# Patient Record
Sex: FEMALE | Race: BLACK OR AFRICAN AMERICAN | NOT HISPANIC OR LATINO | Employment: PART TIME | URBAN - METROPOLITAN AREA
[De-identification: names, ages, dates, MRNs, and addresses within clinical notes are randomized per-mention and may not be internally consistent; named-entity substitution may affect disease eponyms.]

---

## 2017-05-31 ENCOUNTER — OFFICE VISIT (OUTPATIENT)
Dept: FAMILY MEDICINE CLINIC | Facility: CLINIC | Age: 62
End: 2017-05-31

## 2017-05-31 VITALS
DIASTOLIC BLOOD PRESSURE: 80 MMHG | WEIGHT: 207 LBS | HEART RATE: 62 BPM | BODY MASS INDEX: 35.53 KG/M2 | SYSTOLIC BLOOD PRESSURE: 128 MMHG | OXYGEN SATURATION: 98 %

## 2017-05-31 DIAGNOSIS — G43.111 INTRACTABLE MIGRAINE WITH AURA WITH STATUS MIGRAINOSUS: Primary | ICD-10-CM

## 2017-05-31 PROCEDURE — 99213 OFFICE O/P EST LOW 20 MIN: CPT | Performed by: NURSE PRACTITIONER

## 2017-05-31 RX ORDER — SUMATRIPTAN 100 MG/1
100 TABLET, FILM COATED ORAL ONCE AS NEEDED
Qty: 9 TABLET | Refills: 6 | Status: SHIPPED | OUTPATIENT
Start: 2017-05-31

## 2018-04-05 ENCOUNTER — APPOINTMENT (OUTPATIENT)
Dept: WOMENS IMAGING | Facility: HOSPITAL | Age: 63
End: 2018-04-05

## 2018-04-05 PROCEDURE — 76641 ULTRASOUND BREAST COMPLETE: CPT | Performed by: RADIOLOGY

## 2018-04-05 PROCEDURE — G0279 TOMOSYNTHESIS, MAMMO: HCPCS | Performed by: RADIOLOGY

## 2018-04-05 PROCEDURE — 77062 BREAST TOMOSYNTHESIS BI: CPT | Performed by: RADIOLOGY

## 2018-04-05 PROCEDURE — 77066 DX MAMMO INCL CAD BI: CPT | Performed by: RADIOLOGY

## 2018-04-10 ENCOUNTER — APPOINTMENT (OUTPATIENT)
Dept: WOMENS IMAGING | Facility: HOSPITAL | Age: 63
End: 2018-04-10

## 2018-04-10 PROCEDURE — 19083 BX BREAST 1ST LESION US IMAG: CPT | Performed by: RADIOLOGY

## 2018-10-08 ENCOUNTER — APPOINTMENT (OUTPATIENT)
Dept: WOMENS IMAGING | Facility: HOSPITAL | Age: 63
End: 2018-10-08

## 2018-10-08 PROCEDURE — 76641 ULTRASOUND BREAST COMPLETE: CPT | Performed by: RADIOLOGY

## 2019-04-15 ENCOUNTER — OFFICE VISIT (OUTPATIENT)
Dept: FAMILY MEDICINE CLINIC | Facility: CLINIC | Age: 64
End: 2019-04-15

## 2019-04-15 VITALS
OXYGEN SATURATION: 99 % | HEIGHT: 65 IN | WEIGHT: 200 LBS | BODY MASS INDEX: 33.32 KG/M2 | HEART RATE: 64 BPM | RESPIRATION RATE: 16 BRPM | SYSTOLIC BLOOD PRESSURE: 144 MMHG | DIASTOLIC BLOOD PRESSURE: 90 MMHG

## 2019-04-15 DIAGNOSIS — M54.42 ACUTE LEFT-SIDED LOW BACK PAIN WITH LEFT-SIDED SCIATICA: Primary | ICD-10-CM

## 2019-04-15 PROCEDURE — 99213 OFFICE O/P EST LOW 20 MIN: CPT | Performed by: NURSE PRACTITIONER

## 2019-04-15 RX ORDER — METHYLPREDNISOLONE 4 MG/1
TABLET ORAL
Qty: 1 EACH | Refills: 0 | Status: SHIPPED | OUTPATIENT
Start: 2019-04-15 | End: 2019-08-19

## 2019-04-15 NOTE — PROGRESS NOTES
"Cece Bustamante is a 63 y.o. female.      Assessment/Plan   Problem List Items Addressed This Visit     None             No Follow-up on file.  There are no Patient Instructions on file for this visit.    Chief Complaint   Patient presents with   • Leg Pain   • Sciatica     Social History     Tobacco Use   • Smoking status: Former Smoker     Last attempt to quit: 1973     Years since quittin.3   • Smokeless tobacco: Never Used   Substance Use Topics   • Alcohol use: No   • Drug use: No       History of Present Illness   Cece is a pt of Dr Horner.  She is here today w/ Lt leg pain that she believes to be sciatica.  She states she was told many years ago she had a bulging disc.  She states sx began about 3 weeks ago in her Rt leg and then after a few days it went away.  She states 2 weeks ago the pain came back much more severe in the Lt.  She is limping. She is having difficulty sleeping.  She is taking Ibu 800mg which seems to help.    The following portions of the patient's history were reviewed and updated as appropriate:PMHroutine: Social history , Allergies, Current Medications, Active Problem List and Health Maintenance    Review of Systems   Constitutional: Positive for activity change.   Musculoskeletal: Positive for back pain and gait problem.        Buttock pain and Lt leg pain   Neurological: Negative for dizziness, numbness and headaches.   All other systems reviewed and are negative.      Objective   Vitals:    04/15/19 0735   BP: 144/90   Pulse: 64   Resp: 16   SpO2: 99%   Weight: 90.7 kg (200 lb)   Height: 165.1 cm (65\")     Body mass index is 33.28 kg/m².  Physical Exam   Constitutional: She appears well-developed and well-nourished. No distress.   HENT:   Head: Normocephalic and atraumatic.   Eyes: EOM are normal.   Neck: Neck supple. No thyromegaly present.   Cardiovascular: Normal rate, regular rhythm and normal heart sounds.   Pulmonary/Chest: Effort normal and breath sounds normal. "   Musculoskeletal: She exhibits tenderness.   Neurological: She is alert. She displays normal reflexes. No cranial nerve deficit. She exhibits normal muscle tone. Coordination normal.   Skin: Skin is warm.   Nursing note and vitals reviewed.    Reviewed Data:  No visits with results within 1 Month(s) from this visit.   Latest known visit with results is:   Office Visit on 06/17/2016   Component Date Value Ref Range Status   • WBC 06/17/2016 4.46* 4.50 - 10.70 10*3/mm3 Final   • RBC 06/17/2016 4.05  3.90 - 5.20 10*6/mm3 Final   • Hemoglobin 06/17/2016 12.7  11.9 - 15.5 g/dL Final   • Hematocrit 06/17/2016 37.9  35.6 - 45.5 % Final   • MCV 06/17/2016 93.6  80.5 - 98.2 fL Final   • MCH 06/17/2016 31.4  26.9 - 32.7 pg Final   • MCHC 06/17/2016 33.5  32.4 - 36.3 g/dL Final   • RDW 06/17/2016 14.1* 11.7 - 13.0 % Final   • Platelets 06/17/2016 267  140 - 500 10*3/mm3 Final   • Neutrophil Rel % 06/17/2016 29.7* 42.7 - 76.0 % Final   • Lymphocyte Rel % 06/17/2016 58.7* 19.6 - 45.3 % Final   • Monocyte Rel % 06/17/2016 9.0  5.0 - 12.0 % Final   • Eosinophil Rel % 06/17/2016 2.2  0.3 - 6.2 % Final   • Basophil Rel % 06/17/2016 0.4  0.0 - 1.5 % Final   • Neutrophils Absolute 06/17/2016 1.32* 1.90 - 8.10 10*3/mm3 Final   • Lymphocytes Absolute 06/17/2016 2.62  0.90 - 4.80 10*3/mm3 Final   • Monocytes Absolute 06/17/2016 0.40  0.20 - 1.20 10*3/mm3 Final   • Eosinophils Absolute 06/17/2016 0.10  0.00 - 0.70 10*3/mm3 Final   • Basophils Absolute 06/17/2016 0.02  0.00 - 0.20 10*3/mm3 Final   • Immature Granulocyte Rel % 06/17/2016 0.0  0.0 - 0.5 % Final   • Immature Grans Absolute 06/17/2016 0.00  0.00 - 0.03 10*3/mm3 Final   • Total Cholesterol 06/17/2016 239* 0 - 200 mg/dL Final   • Triglycerides 06/17/2016 114  0 - 150 mg/dL Final   • HDL Cholesterol 06/17/2016 101* 40 - 60 mg/dL Final   • VLDL Cholesterol 06/17/2016 22.8  5 - 40 mg/dL Final   • LDL Cholesterol  06/17/2016 115* 0 - 100 mg/dL Final   • LDL/HDL Ratio 06/17/2016  1.14   Final   • Glucose 06/17/2016 90  65 - 99 mg/dL Final   • BUN 06/17/2016 13  8 - 23 mg/dL Final   • Creatinine 06/17/2016 0.81  0.57 - 1.00 mg/dL Final   • eGFR Non  Am 06/17/2016 71  >60 mL/min/1.73 Final   • eGFR African Am 06/17/2016 87  >60 mL/min/1.73 Final   • BUN/Creatinine Ratio 06/17/2016 16.0  7.0 - 25.0 Final   • Sodium 06/17/2016 143  136 - 145 mmol/L Final   • Potassium 06/17/2016 4.7  3.5 - 5.2 mmol/L Final   • Chloride 06/17/2016 104  98 - 107 mmol/L Final   • Total CO2 06/17/2016 26.6  22.0 - 29.0 mmol/L Final   • Calcium 06/17/2016 9.5  8.6 - 10.5 mg/dL Final   • Total Protein 06/17/2016 7.3  6.0 - 8.5 g/dL Final   • Albumin 06/17/2016 4.20  3.50 - 5.20 g/dL Final   • Globulin 06/17/2016 3.1  gm/dL Final   • A/G Ratio 06/17/2016 1.4  g/dL Final   • Total Bilirubin 06/17/2016 0.2  0.1 - 1.2 mg/dL Final   • Alkaline Phosphatase 06/17/2016 74  39 - 117 U/L Final   • AST (SGOT) 06/17/2016 20  1 - 32 U/L Final   • ALT (SGPT) 06/17/2016 15  1 - 33 U/L Final   • TSH 06/17/2016 2.700  0.270 - 4.200 mIU/mL Final   • Differential Comment 06/17/2016 Comment   Final    WBC MORPHOLOGY               Normal                      Normal     N   • Comment 06/17/2016 Comment   Final    RBC MORPHOLOGY               Normal                      Normal     N   • Plt Comment 06/17/2016 Comment   Final    PLATELET MORPHOLOGY          Normal                      Normal     N   • Hemoglobin A1C 06/17/2016 6.00* 4.80 - 5.60 % Final    Comment: Hemoglobin A1C Ranges:  Increased Risk for Diabetes  5.7% to 6.4%  Diabetes                     >= 6.5%  Diabetic Goal                < 7.0%

## 2019-04-15 NOTE — PATIENT INSTRUCTIONS
Sciatica  Sciatica is pain, numbness, weakness, or tingling along your sciatic nerve. The sciatic nerve starts in the lower back and goes down the back of each leg. Sciatica happens when this nerve is pinched or has pressure put on it. Sciatica usually goes away on its own or with treatment. Sometimes, sciatica may keep coming back (recur).  Follow these instructions at home:  Medicines  · Take over-the-counter and prescription medicines only as told by your doctor.  · Do not drive or use heavy machinery while taking prescription pain medicine.  Managing pain  · If directed, put ice on the affected area.  ? Put ice in a plastic bag.  ? Place a towel between your skin and the bag.  ? Leave the ice on for 20 minutes, 2-3 times a day.  · After icing, apply heat to the affected area before you exercise or as often as told by your doctor. Use the heat source that your doctor tells you to use, such as a moist heat pack or a heating pad.  ? Place a towel between your skin and the heat source.  ? Leave the heat on for 20-30 minutes.  ? Remove the heat if your skin turns bright red. This is especially important if you are unable to feel pain, heat, or cold. You may have a greater risk of getting burned.  Activity  · Return to your normal activities as told by your doctor. Ask your doctor what activities are safe for you.  ? Avoid activities that make your sciatica worse.  · Take short rests during the day. Rest in a lying or standing position. This is usually better than sitting to rest.  ? When you rest for a long time, do some physical activity or stretching between periods of rest.  ? Avoid sitting for a long time without moving. Get up and move around at least one time each hour.  · Exercise and stretch regularly, as told by your doctor.  · Do not lift anything that is heavier than 10 lb (4.5 kg) while you have symptoms of sciatica.  ? Avoid lifting heavy things even when you do not have symptoms.  ? Avoid lifting heavy  things over and over.  · When you lift objects, always lift in a way that is safe for your body. To do this, you should:  ? Bend your knees.  ? Keep the object close to your body.  ? Avoid twisting.  General instructions  · Use good posture.  ? Avoid leaning forward when you are sitting.  ? Avoid hunching over when you are standing.  · Stay at a healthy weight.  · Wear comfortable shoes that support your feet. Avoid wearing high heels.  · Avoid sleeping on a mattress that is too soft or too hard. You might have less pain if you sleep on a mattress that is firm enough to support your back.  · Keep all follow-up visits as told by your doctor. This is important.  Contact a doctor if:  · You have pain that:  ? Wakes you up when you are sleeping.  ? Gets worse when you lie down.  ? Is worse than the pain you have had in the past.  ? Lasts longer than 4 weeks.  · You lose weight for without trying.  Get help right away if:  · You cannot control when you pee (urinate) or poop (have a bowel movement).  · You have weakness in any of these areas and it gets worse.  ? Lower back.  ? Lower belly (pelvis).  ? Butt (buttocks).  ? Legs.  · You have redness or swelling of your back.  · You have a burning feeling when you pee.  This information is not intended to replace advice given to you by your health care provider. Make sure you discuss any questions you have with your health care provider.  Document Released: 09/26/2009 Document Revised: 05/25/2017 Document Reviewed: 08/26/2016  Elsevier Interactive Patient Education © 2019 [employer]

## 2019-04-18 PROBLEM — G43.719 INTRACTABLE CHRONIC MIGRAINE WITHOUT AURA AND WITHOUT STATUS MIGRAINOSUS: Status: ACTIVE | Noted: 2017-06-05

## 2019-04-18 RX ORDER — IBUPROFEN 800 MG/1
TABLET ORAL
Qty: 90 TABLET | Refills: 2 | Status: SHIPPED | OUTPATIENT
Start: 2019-04-18 | End: 2022-01-14

## 2019-04-22 ENCOUNTER — HOSPITAL ENCOUNTER (OUTPATIENT)
Dept: PHYSICAL THERAPY | Facility: HOSPITAL | Age: 64
Setting detail: THERAPIES SERIES
Discharge: HOME | End: 2019-04-22

## 2019-04-22 DIAGNOSIS — M54.42 ACUTE LEFT-SIDED LOW BACK PAIN WITH LEFT-SIDED SCIATICA: Primary | ICD-10-CM

## 2019-04-22 PROCEDURE — 97162 PT EVAL MOD COMPLEX 30 MIN: CPT | Performed by: PHYSICAL THERAPIST

## 2019-04-22 PROCEDURE — 97110 THERAPEUTIC EXERCISES: CPT | Performed by: PHYSICAL THERAPIST

## 2019-04-22 NOTE — THERAPY EVALUATION
"    Outpatient Physical Therapy Ortho Initial Evaluation  [facility]     Patient Name: Cece Bustamante  : 1955  MRN: [id number]  Today's Date: 2019      Visit Date: 2019    Patient Active Problem List   Diagnosis   • Cervical disc disorder   • Carpal tunnel syndrome of right wrist   • Intractable chronic migraine without aura and without status migrainosus        Past Medical History:   Diagnosis Date   • Arthritis    • Cervical disc disorder    • Headache    • Lumbosacral disc disease         Past Surgical History:   Procedure Laterality Date   •  SECTION     • CHOLECYSTECTOMY     • COLONOSCOPY         Visit Dx:     ICD-10-CM ICD-9-CM   1. Acute left-sided low back pain with left-sided sciatica M54.42 724.2     724.3         Patient History     Row Name 19 1500             History    Chief Complaint  Pain  -      Type of Pain  Back pain  -      Date Current Problem(s) Began  19 chronic intermittent  -LC      Brief Description of Current Complaint  Pt reports 15 years of chronic low back pain with referred pain symptoms crossing L knee and into L calf. She reports severe pain with tingling into LLE. She reports severe pain with weight bearing through LLE, all bed mobility, ascending/descending stairs, getting in/out of car. When asked if there's anything that makes her back feel better she reports \"meds\". She has not had any back surgery or injections into back. She says that MD years ago told that she has bulging disc in low back. Pt reports that pain in back feels like \"bone on bone\" pain with all movements.  -      Previous treatment for THIS PROBLEM  Medication  -      Patient/Caregiver Goals  Relieve pain;Return to prior level of function;Improve mobility;Improve strength;Know what to do to help the symptoms  -      Patient's Rating of General Health  Good  -      Occupation/sports/leisure activities  teacher  -      How has patient tried to help current " "problem?  ibuprofen, heating pad  -LC      What clinical tests have you had for this problem?  MRI  -LC      Results of Clinical Tests  Per   -LC         Pain     Pain Location  Buttocks;Back  -LC      Pain at Present  2  -LC      Pain at Best  1  -LC      Pain at Worst  10  -LC      Pain Frequency  Constant/continuous  -LC      Pain Description  Aching;Sore;Sharp;Pins and needles;Radiating  -LC      What Performance Factors Make the Current Problem(s) WORSE?  any movement with LLE  -LC      What Performance Factors Make the Current Problem(s) BETTER?  \"meds\" per patient  -LC      Is medication used to assist with sleep?  Yes  -LC      Difficulties with ADL's?  all ADL's per patient  -LC         Fall Risk Assessment    Any falls in the past year:  No  -LC         Daily Activities    Primary Language  English  -LC      Pt Participated in POC and Goals  Yes  -LC         Safety    Are you being hurt, hit, or frightened by anyone at home or in your life?  No  -LC      Are you being neglected by a caregiver  No  -LC        User Key  (r) = Recorded By, (t) = Taken By, (c) = Cosigned By    Initials Name Provider Type    LC Timothy Burns, PT DPT Physical Therapist          PT Ortho     Row Name 04/22/19 1500       Posture/Observations    Posture/Observations Comments  antalgic gait, forward flexed posture  -LC       Lumbar/SI Special Tests    Standing Flexion Test (SI Dysfunction)  Left:;Positive  -LC    Stork Test (SI Dysfunction)  Left:;Positive  -LC    Seated Flexion Test (SI Dysfunction)  Left:;Positive  -LC    Constance Samson Test (HNP)  Left:;Negative  -LC    CHRISTIANO (hip vs. SI Dysfunction)  Left:;Positive  -LC       MMT (Manual Muscle Testing)    Neck/Trunk  Trunk Flexion  -LC    Rt Lower Ext  Rt Hip Flexion;Rt Hip Extension;Rt Hip ABduction;Rt Hip ADduction;Rt Knee Extension;Rt Knee Flexion  -LC    Lt Lower Ext  Lt Hip Flexion;Lt Hip ABduction;Lt Hip Extension;Lt Hip ADduction;Lt Knee Extension;Lt Knee Flexion  -LC       " MMT Neck/Trunk    Trunk Flexion MMT, Gross Movement  (3-/5) fair minus  -LC    Neck/Trunk Comments   poor coordination for pelvic tilt  -LC       MMT Right Lower Ext    Rt Hip Flexion MMT, Gross Movement  (4/5) good  -LC    Rt Hip Extension MMT, Gross Movement  (4/5) good  -LC    Rt Hip ABduction MMT, Gross Movement  (4/5) good  -LC    Rt Hip ADduction MMT, Gross Movement  (4/5) good  -LC    Rt Knee Extension MMT, Gross Movement  (4/5) good  -LC    Rt Knee Flexion MMT, Gross Movement  (4/5) good  -LC       MMT Left Lower Ext    Lt Hip Flexion MMT, Gross Movement  (3/5) fair  -LC    Lt Hip Extension MMT, Gross Movement  (3/5) fair  -LC    Lt Hip ABduction MMT, Gross Movement  (3+/5) fair plus  -LC    Lt Hip ADduction MMT, Gross Movement  (3+/5) fair plus  -LC    Lt Knee Extension MMT, Gross Movement  (3+/5) fair plus  -LC    Lt Knee Flexion MMT, Gross Movement  (3+/5) fair plus  -LC      User Key  (r) = Recorded By, (t) = Taken By, (c) = Cosigned By    Initials Name Provider Type    Timothy Blackmon, PT DPT Physical Therapist                      Therapy Education  Given: HEP, Symptoms/condition management, Pain management  Program: New  How Provided: Verbal, Demonstration, Written  Provided to: Patient  Level of Understanding: Teach back education performed, Verbalized, Demonstrated     PT OP Goals     Row Name 04/22/19 1700          PT Short Term Goals    STG 1  Pt will be independent with HEP to improve lumbar stability and reduce pain with ADL's.  -LC     STG 1 Progress  New  -LC     STG 2  Pt will report pain no worse than 5/10 in low back  -LC     STG 2 Progress  New  -LC     STG 3  Pt will have LLE MMT grossly 4/5  -LC     STG 3 Progress  New  -LC        Long Term Goals    LTG 1  Pt will have modified Oswestry < 50%  -LC     LTG 1 Progress  New  -LC        Time Calculation    PT Goal Re-Cert Due Date  07/22/19  -LC       User Key  (r) = Recorded By, (t) = Taken By, (c) = Cosigned By    Initials Name Provider  "Type    LC Timothy Burns, PT DPT Physical Therapist          PT Assessment/Plan     Row Name 04/22/19 8415          PT Assessment    Functional Limitations  Impaired gait;Limitations in community activities;Limitations in functional capacity and performance;Performance in leisure activities;Performance in self-care ADL;Performance in work activities  -     Impairments  Gait;Joint integrity;Pain;Muscle strength  -     Assessment Comments  Pt is 63 y.o. female who presents with chronic severe low back pain with associated referred pain symptoms into L extremity. She reports 15 years ago onset with MD telling her she had \"bulging disc in lumbar spine\". Pt has not had recent lumbar spine imaging. She reports pain is 10/10 when it occurs and it worsens with all movements. She has general core weakness with poor ability to perform pelvic tilt. Pt was educated on general HEP for lumbar stability and core strengthening. She was issued written copy of HEP. She will benefit from general strengthening and increasing endurance. Special tests indicate L side SI joint pathology could be causing her referred pain symptoms.  -     Please refer to paper survey for additional self-reported information  Yes  -LC     Rehab Potential  Fair  -LC     Patient/caregiver participated in establishment of treatment plan and goals  Yes  -LC     Patient would benefit from skilled therapy intervention  Yes  -LC        PT Plan    PT Frequency  2x/week  -     Predicted Duration of Therapy Intervention (Therapy Eval)  4 to 6 week  -     Planned CPT's?  PT EVAL MOD COMPLELITY: 59357;PT RE-EVAL: 93158;PT THER ACT EA 15 MIN: 58305;PT NEUROMUSC RE-EDUCATION EA 15 MIN: 21485;PT MANUAL THERAPY EA 15 MIN: 82398;PT HOT/COLD PACK WC NONMCARE: 76123;PT ELECTRICAL STIM UNATTEND: ;PT ULTRASOUND EA 15 MIN: 51240  -     Physical Therapy Interventions (Optional Details)  lumbar stabilization;neuromuscular " re-education;stretching;strengthening;postural re-education;modalities;manual therapy techniques;patient/family education;home exercise program  -LC     PT Plan Comments  Skilled intervention to increase core strength and alleviate pain symptoms with ADL's.  -LC       User Key  (r) = Recorded By, (t) = Taken By, (c) = Cosigned By    Initials Name Provider Type    Timothy Blackmon, PT DPT Physical Therapist            Exercises     Row Name 04/22/19 1700             Total Minutes    57961 - PT Therapeutic Exercise Minutes  15  -LC         Exercise 1    Exercise Name 1  low trunk rotation  -LC      Sets 1  2  -LC      Reps 1  10  -LC         Exercise 2    Exercise Name 2  hook hip ADD  -LC      Sets 2  3  -LC      Reps 2  6  -LC      Time 2  3  -LC         Exercise 3    Exercise Name 3  hook hip ABD blue  -LC      Sets 3  3  -LC      Reps 3  6  -LC         Exercise 4    Exercise Name 4  piriformis stretch fig 4  -LC      Sets 4  1  -LC      Reps 4  6  -LC      Time 4  10  -LC        User Key  (r) = Recorded By, (t) = Taken By, (c) = Cosigned By    Initials Name Provider Type    Timothy Blackmon, PT DPT Physical Therapist                        Outcome Measure Options: Modifed Owestry  Modified Oswestry  Modified Oswestry Score/Comments: 82%      Time Calculation:     Start Time: 1545  Stop Time: 1625  Time Calculation (min): 40 min  Total Timed Code Minutes- PT: 40 minute(s)     Therapy Charges for Today     Code Description Service Date Service Provider Modifiers Qty    [id number]  PT THER PROC EA 15 MIN 4/22/2019 Timothy Burns, PT DPT GP 1    [id number]  PT EVAL MOD COMPLEXITY 2 4/22/2019 Timothy Burns, PT DPT GP 1          PT G-Codes  Outcome Measure Options: Modifed Owestry  Modified Oswestry Score/Comments: 82%         KIM GaT  4/22/2019

## 2019-05-01 ENCOUNTER — HOSPITAL ENCOUNTER (OUTPATIENT)
Dept: PHYSICAL THERAPY | Facility: HOSPITAL | Age: 64
Setting detail: THERAPIES SERIES
Discharge: HOME | End: 2019-05-01

## 2019-05-01 DIAGNOSIS — M54.42 ACUTE LEFT-SIDED LOW BACK PAIN WITH LEFT-SIDED SCIATICA: Primary | ICD-10-CM

## 2019-05-01 PROCEDURE — 97110 THERAPEUTIC EXERCISES: CPT

## 2019-05-01 NOTE — THERAPY TREATMENT NOTE
Outpatient Physical Therapy Ortho Treatment Note  [facility]     Patient Name: Cece Bustamante  : 1955  MRN: [id number]  Today's Date: 2019      Visit Date: 2019    Visit Dx:    ICD-10-CM ICD-9-CM   1. Acute left-sided low back pain with left-sided sciatica M54.42 724.2     724.3       Patient Active Problem List   Diagnosis   • Cervical disc disorder   • Carpal tunnel syndrome of right wrist   • Intractable chronic migraine without aura and without status migrainosus        Past Medical History:   Diagnosis Date   • Arthritis    • Cervical disc disorder    • Headache    • Lumbosacral disc disease         Past Surgical History:   Procedure Laterality Date   •  SECTION     • CHOLECYSTECTOMY     • COLONOSCOPY         PT Ortho     Row Name 19 1800       Subjective Comments    Subjective Comments  Episode of back pain many years ago and fine until recent.  C/O left LB pain to lateral calf.  Better if moving around vs standing one spot.  Sx better with Ibuprofen.  -SI       Subjective Pain    Able to rate subjective pain?  yes  -SI    Subjective Pain Comment  currently sx minimal today  -SI       General ROM    GENERAL ROM COMMENTS  trunk side bend to left with increase pain left  -SI       Flexibility    Flexibility Tested?  Lower Extremity  -SI       Lower Extremity Flexibility    Hamstrings  Left:;Moderately limited  -SI       Gait/Stairs Assessment/Training    Comment (Gait/Stairs)  mild antalgia observed initially upon standing  -SI      User Key  (r) = Recorded By, (t) = Taken By, (c) = Cosigned By    Initials Name Provider Type    SI Sherin Block, PTA Physical Therapy Assistant                      PT Assessment/Plan     Row Name 19 1812          PT Assessment    Assessment Comments  Pt to try to avoid prolonged positions.  HS tight on left greater than right.  Ex upgraded today and tolerated well in PT  -SI       User Key  (r) = Recorded By, (t) = Taken By, (c) =  Cosigned By    Initials Name Provider Type    Sherin Aguirre PTA Physical Therapy Assistant            Exercises     Row Name 05/01/19 1800             Subjective Comments    Subjective Comments  Episode of back pain many years ago and fine until recent.  C/O left LB pain to lateral calf.  Better if moving around vs standing one spot.  Sx better with Ibuprofen.  -SI         Subjective Pain    Able to rate subjective pain?  yes  -SI      Subjective Pain Comment  currently sx minimal today  -SI         Total Minutes    21580 - PT Therapeutic Exercise Minutes  45  -SI         Exercise 1    Exercise Name 1  low trunk rotation  -SI      Sets 1  2  -SI      Reps 1  10  -SI         Exercise 2    Exercise Name 2  hook hip ADD  -SI      Sets 2  2  -SI      Reps 2  10  -SI      Time 2  3  -SI         Exercise 3    Exercise Name 3  hook hip ABD blue  -SI      Sets 3  2  -SI      Reps 3  10  -SI         Exercise 4    Exercise Name 4  piriformis stretch fig 4  -SI      Sets 4  1  -SI      Reps 4  3  -SI      Time 4  20  -SI         Exercise 5    Exercise Name 5  ppt  -SI      Reps 5  20  -SI         Exercise 6    Exercise Name 6  DKC  -SI      Reps 6  3  -SI      Time 6  20  -SI         Exercise 7    Exercise Name 7  supine HS stretch  -SI      Reps 7  3  -SI      Time 7  20  -SI      Additional Comments  BLE'e  -SI        User Key  (r) = Recorded By, (t) = Taken By, (c) = Cosigned By    Initials Name Provider Type    Sherin Aguirre PTA Physical Therapy Assistant                           Therapy Education  Given: HEP, Symptoms/condition management  Program: Progressed  How Provided: Verbal, Demonstration, Written  Provided to: Patient  Level of Understanding: Teach back education performed              Time Calculation:   Start Time: 1715  Stop Time: 1800  Time Calculation (min): 45 min  Total Timed Code Minutes- PT: 45 minute(s)  Therapy Charges for Today     Code Description Service Date Service Provider Modifiers  Qty    [id number]  PT THER PROC EA 15 MIN 5/1/2019 Sherin Block, PTA GP 3                    Sherin Block, VELIA  5/1/2019

## 2019-05-08 ENCOUNTER — HOSPITAL ENCOUNTER (OUTPATIENT)
Dept: PHYSICAL THERAPY | Facility: HOSPITAL | Age: 64
Setting detail: THERAPIES SERIES
Discharge: HOME | End: 2019-05-08

## 2019-05-08 DIAGNOSIS — M54.42 ACUTE LEFT-SIDED LOW BACK PAIN WITH LEFT-SIDED SCIATICA: Primary | ICD-10-CM

## 2019-05-08 PROCEDURE — 97110 THERAPEUTIC EXERCISES: CPT

## 2019-05-08 NOTE — THERAPY TREATMENT NOTE
Outpatient Physical Therapy Ortho Treatment Note  [facility]     Patient Name: Cece Bustamante  : 1955  MRN: [id number]  Today's Date: 2019      Visit Date: 2019    Visit Dx:    ICD-10-CM ICD-9-CM   1. Acute left-sided low back pain with left-sided sciatica M54.42 724.2     724.3       Patient Active Problem List   Diagnosis   • Cervical disc disorder   • Carpal tunnel syndrome of right wrist   • Intractable chronic migraine without aura and without status migrainosus        Past Medical History:   Diagnosis Date   • Arthritis    • Cervical disc disorder    • Headache    • Lumbosacral disc disease         Past Surgical History:   Procedure Laterality Date   •  SECTION     • CHOLECYSTECTOMY     • COLONOSCOPY         PT Ortho     Row Name 19 1700       Subjective Comments    Subjective Comments  More back stiffness feeling than pain but did have several episodes of left lateral calf pain.  -SI       Subjective Pain    Able to rate subjective pain?  yes  -SI       Flexibility    Flexibility Tested?  Lower Extremity all MMgroups tighter left than right  -SI       Lower Extremity Flexibility    Hamstrings  Left:;Moderately limited  -SI      User Key  (r) = Recorded By, (t) = Taken By, (c) = Cosigned By    Initials Name Provider Type    Sherin Aguirre PTA Physical Therapy Assistant                      PT Assessment/Plan     Row Name 19 1717          PT Assessment    Assessment Comments  Fair compliance with full HEP as busy .  Told OK to do only 1 rep stretching ex on right but 3 on left and try for all ex as instructed once a day. Overall sx changing but not better yet  -SI       User Key  (r) = Recorded By, (t) = Taken By, (c) = Cosigned By    Initials Name Provider Type    Sherin Aguirre PTA Physical Therapy Assistant            Exercises     Row Name 19 1663             Subjective Comments    Subjective Comments  More back stiffness feeling than pain but did  have several episodes of left lateral calf pain.  -SI         Subjective Pain    Able to rate subjective pain?  yes  -SI         Total Minutes    25768 - PT Therapeutic Exercise Minutes  45  -SI         Exercise 1    Exercise Name 1  low trunk rotation  -SI      Sets 1  2  -SI      Reps 1  10  -SI         Exercise 2    Exercise Name 2  hook hip ADD  -SI      Sets 2  2  -SI      Reps 2  10  -SI      Time 2  3  -SI         Exercise 3    Exercise Name 3  hook hip ABD blue  -SI      Sets 3  2  -SI      Reps 3  10  -SI         Exercise 4    Exercise Name 4  piriformis stretch fig 4  -SI      Sets 4  1  -SI      Reps 4  3  -SI      Time 4  20  -SI         Exercise 5    Exercise Name 5  ppt and ppt with altermate knee  -SI      Reps 5  20  -SI      Additional Comments  1 set 10 each  -SI         Exercise 6    Exercise Name 6  DKC  -SI      Reps 6  3  -SI      Time 6  20  -SI         Exercise 7    Exercise Name 7  supine HS stretch  -SI      Reps 7  3  -SI      Time 7  20  -SI         Exercise 8    Exercise Name 8  piroformis stretch with IR  -SI      Reps 8  3  -SI      Time 8  20  -SI        User Key  (r) = Recorded By, (t) = Taken By, (c) = Cosigned By    Initials Name Provider Type    Sherin Aguirre PTA Physical Therapy Assistant                       PT OP Goals     Row Name 05/08/19 1700          PT Short Term Goals    STG 1  Pt will be independent with HEP to improve lumbar stability and reduce pain with ADL's.  -SI     STG 1 Progress  Progressing  -SI     STG 3  Pt will have LLE MMT grossly 4/5  -SI     STG 3 Progress  Progressing  -SI       User Key  (r) = Recorded By, (t) = Taken By, (c) = Cosigned By    Initials Name Provider Type    Sherin Aguirre PTA Physical Therapy Assistant          Therapy Education  Given: Posture/body mechanics(P and B mechanics discussed and written info given)  Program: Progressed  How Provided: Verbal, Demonstration, Written  Provided to: Patient  Level of Understanding:  Teach back education performed              Time Calculation:   Start Time: 1630  Stop Time: 1715  Time Calculation (min): 45 min  Total Timed Code Minutes- PT: 45 minute(s)  Therapy Charges for Today     Code Description Service Date Service Provider Modifiers Qty    53955801568 HC PT THER PROC EA 15 MIN 5/8/2019 Sherin Block, PTA GP 3                    Sherin Block, VELIA  5/8/2019

## 2019-05-10 ENCOUNTER — APPOINTMENT (OUTPATIENT)
Dept: PHYSICAL THERAPY | Facility: HOSPITAL | Age: 64
End: 2019-05-10

## 2019-05-15 ENCOUNTER — HOSPITAL ENCOUNTER (OUTPATIENT)
Dept: PHYSICAL THERAPY | Facility: HOSPITAL | Age: 64
Setting detail: THERAPIES SERIES
Discharge: HOME | End: 2019-05-15

## 2019-05-15 DIAGNOSIS — M54.42 ACUTE LEFT-SIDED LOW BACK PAIN WITH LEFT-SIDED SCIATICA: Primary | ICD-10-CM

## 2019-05-15 PROCEDURE — 97110 THERAPEUTIC EXERCISES: CPT

## 2019-05-15 NOTE — THERAPY TREATMENT NOTE
Outpatient Physical Therapy Ortho Treatment Note  [facility]     Patient Name: Cece Bustamante  : 1955  MRN: [id number]  Today's Date: 5/15/2019      Visit Date: 05/15/2019    Visit Dx:    ICD-10-CM ICD-9-CM   1. Acute left-sided low back pain with left-sided sciatica M54.42 724.2     724.3       Patient Active Problem List   Diagnosis   • Cervical disc disorder   • Carpal tunnel syndrome of right wrist   • Intractable chronic migraine without aura and without status migrainosus        Past Medical History:   Diagnosis Date   • Arthritis    • Cervical disc disorder    • Headache    • Lumbosacral disc disease         Past Surgical History:   Procedure Laterality Date   •  SECTION     • CHOLECYSTECTOMY     • COLONOSCOPY         PT Ortho     Row Name 05/15/19 1700       Subjective Comments    Subjective Comments  Pt repors increase back pain after last session for a day.  Fine since.  tired after work and doing ex about 50%.  -SI       Subjective Pain    Able to rate subjective pain?  yes  -SI    Subjective Pain Comment  Sx minimal to none at time of tx  -SI       Posture/Observations    Posture/Observations Comments  -- carring a heavy book bag....  -SI       MMT Neck/Trunk    Neck/Trunk Comments   pelvic tilt good  -SI       MMT Left Lower Ext    Lt Hip Flexion MMT, Gross Movement  (4-/5) good minus  -SI    Lt Hip Extension MMT, Gross Movement  (4-/5) good minus  -SI    Lt Hip ABduction MMT, Gross Movement  (4-/5) good minus  -SI    Lt Hip ADduction MMT, Gross Movement  (4-/5) good minus  -SI    Lt Knee Extension MMT, Gross Movement  (4-/5) good minus  -SI    Lt Knee Flexion MMT, Gross Movement  (4-/5) good minus  -SI      User Key  (r) = Recorded By, (t) = Taken By, (c) = Cosigned By    Initials Name Provider Type    SI Sherin Block, PTA Physical Therapy Assistant                      PT Assessment/Plan     Row Name 05/15/19 1800          PT Assessment    Assessment Comments  Pt not sx free and  still with intermittent back stiffness, and left calf pain.  Overall she is better and not taking Ibuprofen daily as she was  -SI       User Key  (r) = Recorded By, (t) = Taken By, (c) = Cosigned By    Initials Name Provider Type    Sherin Aguirre PTA Physical Therapy Assistant            Exercises     Row Name 05/15/19 1700             Subjective Comments    Subjective Comments  Pt repors increase back pain after last session for a day.  Fine since.  tired after work and doing ex about 50%.  -SI         Subjective Pain    Able to rate subjective pain?  yes  -SI      Subjective Pain Comment  Sx minimal to none at time of tx  -SI         Total Minutes    03709 - PT Therapeutic Exercise Minutes  40  -SI      66675 - PT Manual Therapy Minutes  5  -SI         Exercise 1    Exercise Name 1  low trunk rotation  -SI      Sets 1  2  -SI      Reps 1  10  -SI         Exercise 2    Exercise Name 2  hook hip ADD  -SI      Sets 2  2  -SI      Reps 2  10  -SI      Time 2  3  -SI         Exercise 3    Exercise Name 3  hook hip ABD blue  -SI      Sets 3  2  -SI      Reps 3  10  -SI         Exercise 4    Exercise Name 4  piriformis stretch fig 4  -SI      Sets 4  1  -SI      Reps 4  3  -SI      Time 4  20  -SI         Exercise 5    Exercise Name 5  ppt    -SI      Reps 5  20  -SI      Additional Comments  hold on advanced version  -SI         Exercise 6    Exercise Name 6  DKC  -SI      Reps 6  3  -SI      Time 6  20  -SI         Exercise 7    Exercise Name 7  supine HS stretch  -SI      Reps 7  3  -SI      Time 7  20  -SI         Exercise 8    Exercise Name 8  piroformis stretch with IR  -SI      Reps 8  3  -SI      Time 8  20  -SI        User Key  (r) = Recorded By, (t) = Taken By, (c) = Cosigned By    Initials Name Provider Type    Sherin Aguirre PTA Physical Therapy Assistant                      Manual Rx (last 36 hours)      Manual Treatments     Row Name 05/15/19 1700             Total Minutes    01863 - PT Manual  Therapy Minutes  5  -SI         Manual Rx 1    Manual Rx 1 Location  long axis traction LLE  -SI      Manual Rx 1 Duration  5  -SI        User Key  (r) = Recorded By, (t) = Taken By, (c) = Cosigned By    Initials Name Provider Type    Sherin Aguirre PTA Physical Therapy Assistant          PT OP Goals     Row Name 05/15/19 1800          PT Short Term Goals    STG 1  Pt will be independent with HEP to improve lumbar stability and reduce pain with ADL's.  -SI     STG 1 Progress  Progressing  -SI     STG 2  Pt will report pain no worse than 5/10 in low back  -SI     STG 2 Progress  Progressing  -SI     STG 3  Pt will have LLE MMT grossly 4/5  -SI     STG 3 Progress  Progressing  -SI        Long Term Goals    LTG 1  Pt will have modified Oswestry < 50%  -SI     LTG 1 Progress  New  -SI       User Key  (r) = Recorded By, (t) = Taken By, (c) = Cosigned By    Initials Name Provider Type    Sherin Aguirre PTA Physical Therapy Assistant          Therapy Education  Given: HEP, Symptoms/condition management, Posture/body mechanics(posture review. instruction in 90/90 resting postition)  Program: Modified  How Provided: Verbal  Provided to: Patient  Level of Understanding: Teach back education performed              Time Calculation:   Start Time: 1635  Stop Time: 1720  Time Calculation (min): 45 min  Total Timed Code Minutes- PT: 45 minute(s)  Therapy Charges for Today     Code Description Service Date Service Provider Modifiers Qty    18806936502 HC PT THER PROC EA 15 MIN 5/15/2019 Sherin Block PTA  3                    Sherin Block PTA  5/15/2019

## 2019-05-17 ENCOUNTER — APPOINTMENT (OUTPATIENT)
Dept: PHYSICAL THERAPY | Facility: HOSPITAL | Age: 64
End: 2019-05-17

## 2019-05-22 ENCOUNTER — HOSPITAL ENCOUNTER (OUTPATIENT)
Dept: PHYSICAL THERAPY | Facility: HOSPITAL | Age: 64
Setting detail: THERAPIES SERIES
Discharge: HOME | End: 2019-05-22

## 2019-05-22 DIAGNOSIS — M54.42 ACUTE LEFT-SIDED LOW BACK PAIN WITH LEFT-SIDED SCIATICA: Primary | ICD-10-CM

## 2019-05-22 PROCEDURE — 97110 THERAPEUTIC EXERCISES: CPT

## 2019-05-22 NOTE — THERAPY DISCHARGE NOTE
Outpatient Physical Therapy Ortho Treatment Note/Discharge Summary  [facility]     Patient Name: Cece Bustamante  : 1955  MRN: [id number]  Today's Date: 2019      Visit Date: 2019    Visit Dx:    ICD-10-CM ICD-9-CM   1. Acute left-sided low back pain with left-sided sciatica M54.42 724.2     724.3       Patient Active Problem List   Diagnosis   • Cervical disc disorder   • Carpal tunnel syndrome of right wrist   • Intractable chronic migraine without aura and without status migrainosus        Past Medical History:   Diagnosis Date   • Arthritis    • Cervical disc disorder    • Headache    • Lumbosacral disc disease         Past Surgical History:   Procedure Laterality Date   •  SECTION     • CHOLECYSTECTOMY     • COLONOSCOPY         PT Ortho     Row Name 19 1700       Subjective Comments    Subjective Comments  pt states she has had no back or leg pain in a week and hasn't taken ibuprofen.  Doing HEP nearly every day  -SI       Subjective Pain    Able to rate subjective pain?  yes  -SI    Pre-Treatment Pain Level  0  -SI    Post-Treatment Pain Level  0  -SI       MMT Right Lower Ext    Rt Hip Flexion MMT, Gross Movement  (4/5) good  -SI    Rt Hip Extension MMT, Gross Movement  (4/5) good  -SI    Rt Hip ABduction MMT, Gross Movement  (4/5) good  -SI    Rt Hip ADduction MMT, Gross Movement  (4/5) good  -SI    Rt Knee Extension MMT, Gross Movement  (4/5) good  -SI    Rt Knee Flexion MMT, Gross Movement  (4/5) good  -SI       MMT Left Lower Ext    Lt Hip Flexion MMT, Gross Movement  (4/5) good  -SI    Lt Hip Extension MMT, Gross Movement  (4/5) good  -SI    Lt Hip ABduction MMT, Gross Movement  (4/5) good  -SI    Lt Hip ADduction MMT, Gross Movement  (4/5) good  -SI    Lt Knee Extension MMT, Gross Movement  (4/5) good  -SI    Lt Knee Flexion MMT, Gross Movement  (4/5) good  -SI       Lower Extremity Flexibility    Hamstrings  Bilateral:;Mildly limited  -SI       Gait/Stairs  Assessment/Training    Comment (Gait/Stairs)  normal gait level surfaces and stairs  -SI      User Key  (r) = Recorded By, (t) = Taken By, (c) = Cosigned By    Initials Name Provider Type    Sherin Aguirre PTA Physical Therapy Assistant                      PT Assessment/Plan     Row Name 05/22/19 1731          PT Assessment    Assessment Comments  Pt feels she is ready to DC PT.  More aware of proper poture with neck and back and ind with HEP.  Sx free for a week.  -SI       User Key  (r) = Recorded By, (t) = Taken By, (c) = Cosigned By    Initials Name Provider Type    Sherin Aguirre PTA Physical Therapy Assistant              Exercises     Row Name 05/22/19 1700 05/22/19 1600          Subjective Comments    Subjective Comments  pt states she has had no back or leg pain in a week and hasn't taken ibuprofen.  Doing HEP nearly every day  -SI  --        Subjective Pain    Able to rate subjective pain?  yes  -SI  --     Pre-Treatment Pain Level  0  -SI  --     Post-Treatment Pain Level  0  -SI  --        Total Minutes    71573 - PT Therapeutic Exercise Minutes  --  45  -SI        Exercise 1    Exercise Name 1  --  low trunk rotation  -SI     Sets 1  --  2  -SI     Reps 1  --  10  -SI        Exercise 2    Exercise Name 2  --  hook hip ADD  -SI     Sets 2  --  2  -SI     Reps 2  --  10  -SI     Time 2  --  3  -SI        Exercise 3    Exercise Name 3  --  hook hip ABD blue  -SI     Sets 3  --  2  -SI     Reps 3  --  10  -SI        Exercise 4    Exercise Name 4  --  piriformis stretch fig 4  -SI     Sets 4  --  1  -SI     Reps 4  --  3  -SI     Time 4  --  20  -SI        Exercise 5    Exercise Name 5  --  ppt  snd ppt with alt knee  -SI     Reps 5  --  20  -SI        Exercise 6    Exercise Name 6  --  DKC  -SI     Reps 6  --  3  -SI     Time 6  --  20  -SI        Exercise 7    Exercise Name 7  --  supine HS stretch  -SI     Reps 7  --  3  -SI     Time 7  --  20  -SI        Exercise 8    Exercise Name 8  --   piroformis stretch with IR  -SI     Reps 8  --  3  -SI     Time 8  --  20  -SI       User Key  (r) = Recorded By, (t) = Taken By, (c) = Cosigned By    Initials Name Provider Type    Sherin Aguirre PTA Physical Therapy Assistant                         PT OP Goals     Row Name 05/22/19 1700          PT Short Term Goals    STG 1  Pt will be independent with HEP to improve lumbar stability and reduce pain with ADL's.  -SI     STG 1 Progress  Met  -SI     STG 2  Pt will report pain no worse than 5/10 in low back  -SI     STG 2 Progress  Met  -SI     STG 3  Pt will have LLE MMT grossly 4/5  -SI     STG 3 Progress  Met  -SI        Long Term Goals    LTG 1  Pt will have modified Oswestry < 50%  -SI     LTG 1 Progress  Met 8%  -SI       User Key  (r) = Recorded By, (t) = Taken By, (c) = Cosigned By    Initials Name Provider Type    Sherin Aguirre PTA Physical Therapy Assistant          Therapy Education  Given: HEP, Symptoms/condition management, Posture/body mechanics(sit posture and bending mechanics reviewed today)  Program: Reinforced  How Provided: Verbal, Demonstration, Written  Provided to: Patient  Level of Understanding: Teach back education performed    Outcome Measure Options: Modifed Owestry  Modified Oswestry  Modified Oswestry Score/Comments: 8%      Time Calculation:   Start Time: 1640  Stop Time: 1725  Time Calculation (min): 45 min  Total Timed Code Minutes- PT: 45 minute(s)  Therapy Charges for Today     Code Description Service Date Service Provider Modifiers Qty    93049172646 HC PT THER PROC EA 15 MIN 5/22/2019 Sherin Block PTA GP 3          PT G-Codes  Outcome Measure Options: Modifed Owestry  Modified Oswestry Score/Comments: 8%     OP PT Discharge Summary  Date of Discharge: 05/22/19  Reason for Discharge: Independent, All goals achieved  Outcomes Achieved: Able to achieve all goals within established timeline  Discharge Destination: Home with home program  Discharge  Instructions/Additional Comments: HEP 5 days a week indefinately      Sherin Block, PTA  5/22/2019

## 2019-05-24 ENCOUNTER — APPOINTMENT (OUTPATIENT)
Dept: PHYSICAL THERAPY | Facility: HOSPITAL | Age: 64
End: 2019-05-24

## 2019-05-29 ENCOUNTER — APPOINTMENT (OUTPATIENT)
Dept: PHYSICAL THERAPY | Facility: HOSPITAL | Age: 64
End: 2019-05-29

## 2019-05-31 ENCOUNTER — APPOINTMENT (OUTPATIENT)
Dept: PHYSICAL THERAPY | Facility: HOSPITAL | Age: 64
End: 2019-05-31

## 2019-06-14 ENCOUNTER — TELEPHONE (OUTPATIENT)
Dept: FAMILY MEDICINE CLINIC | Facility: CLINIC | Age: 64
End: 2019-06-14

## 2019-06-14 DIAGNOSIS — M54.30 SCIATICA, UNSPECIFIED LATERALITY: Primary | ICD-10-CM

## 2019-06-14 NOTE — TELEPHONE ENCOUNTER
Pt has been out of pt x 1 mo and sx are worsening.  Advised stopping Ibu and switching to Aleve.  Ice after exercise, heat in the morning and we will get MRI to further evaluate pt sx.

## 2019-06-26 DIAGNOSIS — M47.816 SPONDYLOSIS OF LUMBAR SPINE: Primary | ICD-10-CM

## 2019-07-26 ENCOUNTER — OFFICE VISIT (OUTPATIENT)
Dept: ORTHOPEDIC SURGERY | Facility: CLINIC | Age: 64
End: 2019-07-26

## 2019-07-26 VITALS — WEIGHT: 203 LBS | HEIGHT: 64 IN | BODY MASS INDEX: 34.66 KG/M2

## 2019-07-26 DIAGNOSIS — M48.062 SPINAL STENOSIS OF LUMBAR REGION WITH NEUROGENIC CLAUDICATION: ICD-10-CM

## 2019-07-26 DIAGNOSIS — M54.50 LUMBAR SPINE PAIN: Primary | ICD-10-CM

## 2019-07-26 DIAGNOSIS — M43.16 SPONDYLOLISTHESIS OF LUMBAR REGION: ICD-10-CM

## 2019-07-26 PROCEDURE — 99204 OFFICE O/P NEW MOD 45 MIN: CPT | Performed by: ORTHOPAEDIC SURGERY

## 2019-07-26 PROCEDURE — 72100 X-RAY EXAM L-S SPINE 2/3 VWS: CPT | Performed by: ORTHOPAEDIC SURGERY

## 2019-07-26 RX ORDER — TRAMADOL HYDROCHLORIDE 50 MG/1
TABLET ORAL
Qty: 35 TABLET | Refills: 0 | Status: SHIPPED | OUTPATIENT
Start: 2019-07-26 | End: 2019-08-19

## 2019-07-26 RX ORDER — NAPROXEN SODIUM 220 MG
220 TABLET ORAL AS NEEDED
COMMUNITY

## 2019-07-26 NOTE — PROGRESS NOTES
New patient or new problem visit    Chief Complaint   Patient presents with   • Lumbar Spine - Establish Care, Pain       HPI: She complains of better than 4-month history of back pain radiating into the left lower extremity into the leg.  The leg pain exceeds the back pain.  She has not improved with physical therapy I have Aleve and ibuprofen.  She states that the Aleve irritates her stomach.  No balance difficulties bowel or bladder complaints but the pain sometimes interferes with the strength in her leg.  She notes she had a bulging disc in the 80s.    PFSH: See chart- reviewed    Review of Systems   Constitutional: Negative for chills, fever and unexpected weight change.   HENT: Negative for trouble swallowing and voice change.    Eyes: Negative for visual disturbance.   Respiratory: Negative for cough and shortness of breath.    Cardiovascular: Negative for chest pain and leg swelling.   Gastrointestinal: Negative for abdominal pain, nausea and vomiting.   Endocrine: Negative for cold intolerance and heat intolerance.   Genitourinary: Negative for difficulty urinating, frequency and urgency.   Musculoskeletal: Positive for back pain and gait problem.   Skin: Negative for rash and wound.   Allergic/Immunologic: Negative for immunocompromised state.   Neurological: Negative for weakness and numbness.   Hematological: Does not bruise/bleed easily.   Psychiatric/Behavioral: Negative for dysphoric mood. The patient is not nervous/anxious.        PE: Constitutional: Vital signs above-noted.  Awake, alert and oriented    Psychiatric: Affect and insight do not appear grossly disturbed.    Pulmonary: Breathing is unlabored, color is good.    Skin: Warm, dry and normal turgor    Cardiac: Pedal pulses intact.  No edema.    Eyesight and hearing appear adequate for examination purposes      Musculoskeletal:  There is minimal tenderness to percussion and palpation of the spine. Motion appears undisturbed.  Posture is  unremarkable to coronal and sagittal inspection.    The skin about the area is not inspected.  There is no palpable or visible deformity.  There is no local spasm.       Neurologic:   Reflexes are absent in the patellae and achilles.   Motor function is undisturbed in quadriceps, EHL, and gastrocnemius   sensation appears symmetrically intact to light touch.  In the bilateral lower extremities there is no evidence of atrophy.   Clonus is absent..  Gait appears undisturbed.  SLR test negative Stinchfield test is negative left hip.      MEDICAL DECISION MAKING    XRAY: Film x-rays of the lumbar spine show a hint of spondylolisthesis at L4-5 and look otherwise fairly unremarkable.  MRI scan shows very mild stenosis at L4-5 and then a central disc protrusion at L5-S1.    Other: n/a    Impression: Certainly sounds like lumbar radiculopathy and may be related to one or both of the lesions noted on MRI.  I suspect she has a degenerative spondylolisthesis which looks fairly benign lying supine in the MRI and may worsen on standing or weightbearing posture.  In any event an epidural steroid should help reduce the symptoms perhaps for a significant amount of time.  On the other hand if the epidural does not work then we might want to look elsewhere for possible source of leg pain.  Still I think it is radicular.    Plan: Lumbar epidural injections and PRN follow-up.  I am given her some tramadol to last a little while until that

## 2019-07-29 ENCOUNTER — TELEPHONE (OUTPATIENT)
Dept: ORTHOPEDIC SURGERY | Facility: CLINIC | Age: 64
End: 2019-07-29

## 2019-07-29 NOTE — TELEPHONE ENCOUNTER
No, do the epidural well act as a test and if it takes care of the hip pain we know it is coming from the back

## 2019-07-29 NOTE — TELEPHONE ENCOUNTER
Patient called stating she thinks her left hip may be causing her pain. She says JERMAINE said her back MRI was not that bad. She wonders if she should still have the LESI's or get the hip checked out?

## 2019-08-19 ENCOUNTER — HOSPITAL ENCOUNTER (OUTPATIENT)
Dept: GENERAL RADIOLOGY | Facility: HOSPITAL | Age: 64
Discharge: HOME | End: 2019-08-19

## 2019-08-19 ENCOUNTER — ANESTHESIA (OUTPATIENT)
Dept: PAIN MEDICINE | Facility: HOSPITAL | Age: 64
End: 2019-08-19

## 2019-08-19 ENCOUNTER — HOSPITAL ENCOUNTER (OUTPATIENT)
Dept: PAIN MEDICINE | Facility: HOSPITAL | Age: 64
Discharge: HOME | End: 2019-08-19
Admitting: ANESTHESIOLOGY

## 2019-08-19 ENCOUNTER — ANESTHESIA EVENT (OUTPATIENT)
Dept: PAIN MEDICINE | Facility: HOSPITAL | Age: 64
End: 2019-08-19

## 2019-08-19 VITALS
WEIGHT: 202 LBS | TEMPERATURE: 97.7 F | RESPIRATION RATE: 16 BRPM | HEART RATE: 76 BPM | OXYGEN SATURATION: 96 % | HEIGHT: 64 IN | BODY MASS INDEX: 34.49 KG/M2 | SYSTOLIC BLOOD PRESSURE: 126 MMHG | DIASTOLIC BLOOD PRESSURE: 76 MMHG

## 2019-08-19 DIAGNOSIS — M51.26 DISPLACEMENT OF LUMBAR INTERVERTEBRAL DISC WITHOUT MYELOPATHY: Primary | ICD-10-CM

## 2019-08-19 DIAGNOSIS — R52 PAIN: ICD-10-CM

## 2019-08-19 PROCEDURE — 25010000002 MIDAZOLAM PER 1 MG: Performed by: ANESTHESIOLOGY

## 2019-08-19 PROCEDURE — 25010000002 METHYLPREDNISOLONE PER 80 MG: Performed by: ANESTHESIOLOGY

## 2019-08-19 PROCEDURE — 77003 FLUOROGUIDE FOR SPINE INJECT: CPT

## 2019-08-19 PROCEDURE — C1755 CATHETER, INTRASPINAL: HCPCS

## 2019-08-19 PROCEDURE — 25010000002 FENTANYL CITRATE (PF) 100 MCG/2ML SOLUTION: Performed by: ANESTHESIOLOGY

## 2019-08-19 RX ORDER — METHYLPREDNISOLONE ACETATE 80 MG/ML
80 INJECTION, SUSPENSION INTRA-ARTICULAR; INTRALESIONAL; INTRAMUSCULAR; SOFT TISSUE ONCE
Status: COMPLETED | OUTPATIENT
Start: 2019-08-19 | End: 2019-08-19

## 2019-08-19 RX ORDER — LIDOCAINE HYDROCHLORIDE 10 MG/ML
0.5 INJECTION, SOLUTION INFILTRATION; PERINEURAL ONCE AS NEEDED
Status: DISCONTINUED | OUTPATIENT
Start: 2019-08-19 | End: 2019-08-20 | Stop reason: HOSPADM

## 2019-08-19 RX ORDER — MIDAZOLAM HYDROCHLORIDE 1 MG/ML
1 INJECTION INTRAMUSCULAR; INTRAVENOUS
Status: DISCONTINUED | OUTPATIENT
Start: 2019-08-19 | End: 2019-08-20 | Stop reason: HOSPADM

## 2019-08-19 RX ORDER — SODIUM CHLORIDE 0.9 % (FLUSH) 0.9 %
3 SYRINGE (ML) INJECTION EVERY 12 HOURS SCHEDULED
Status: DISCONTINUED | OUTPATIENT
Start: 2019-08-19 | End: 2019-08-20 | Stop reason: HOSPADM

## 2019-08-19 RX ORDER — FENTANYL CITRATE 50 UG/ML
50 INJECTION, SOLUTION INTRAMUSCULAR; INTRAVENOUS AS NEEDED
Status: DISCONTINUED | OUTPATIENT
Start: 2019-08-19 | End: 2019-08-20 | Stop reason: HOSPADM

## 2019-08-19 RX ORDER — SODIUM CHLORIDE 0.9 % (FLUSH) 0.9 %
3-10 SYRINGE (ML) INJECTION AS NEEDED
Status: DISCONTINUED | OUTPATIENT
Start: 2019-08-19 | End: 2019-08-20 | Stop reason: HOSPADM

## 2019-08-19 RX ORDER — LIDOCAINE HYDROCHLORIDE 10 MG/ML
1 INJECTION, SOLUTION INFILTRATION; PERINEURAL ONCE
Status: DISCONTINUED | OUTPATIENT
Start: 2019-08-19 | End: 2019-08-20 | Stop reason: HOSPADM

## 2019-08-19 RX ADMIN — FENTANYL CITRATE 50 MCG: 50 INJECTION, SOLUTION INTRAMUSCULAR; INTRAVENOUS at 10:21

## 2019-08-19 RX ADMIN — MIDAZOLAM 1 MG: 1 INJECTION INTRAMUSCULAR; INTRAVENOUS at 10:21

## 2019-08-19 RX ADMIN — METHYLPREDNISOLONE ACETATE 80 MG: 80 INJECTION, SUSPENSION INTRA-ARTICULAR; INTRALESIONAL; INTRAMUSCULAR; SOFT TISSUE at 10:26

## 2019-08-19 NOTE — H&P
"[facility]    History and Physical    Patient Name: Cece Bustamante  :  1955  MRN:  [id number]  Date of Admission: 2019    Subjective     Patient is a 64 y.o. female presents with chief complaint of chronic, constant, severe, 1-10/10, ? etiology, aching, sharp, cramping low back and leg: left pain.  Onset of symptoms was gradual starting 6 months ago.  Symptoms are associated/aggravated by nothing in particular, lifting, sitting or bending. Symptoms improve with pain medication    The following portions of the patients history were reviewed and updated as appropriate: current medications, allergies, past medical history, past surgical history, past family history, past social history and problem list                Objective     Past Medical History:   Past Medical History:   Diagnosis Date   • Arthritis    • Cervical disc disorder    • Headache    • Low back pain    • Lumbosacral disc disease    • Peripheral neuropathy      Past Surgical History:   Past Surgical History:   Procedure Laterality Date   •  SECTION     • CHOLECYSTECTOMY     • COLONOSCOPY       Family History:   Family History   Problem Relation Age of Onset   • Cancer Mother    • Cancer Father    • Stroke Paternal Grandmother      Social History:   Social History     Tobacco Use   • Smoking status: Former Smoker     Last attempt to quit: 1973     Years since quittin.6   • Smokeless tobacco: Never Used   Substance Use Topics   • Alcohol use: No   • Drug use: No       Vital Signs Range for the last 24 hours  Temperature: Temp:  [36.5 °C (97.7 °F)] 36.5 °C (97.7 °F)   Temp Source: Temp src: Oral   BP: BP: (130)/(69) 130/69   Pulse: Heart Rate:  [71] 71   Respirations: Resp:  [16] 16   SPO2: SpO2:  [96 %] 96 %   O2 Amount (l/min):     O2 Devices Device (Oxygen Therapy): room air   Weight: Weight:  [91.6 kg (202 lb)] 91.6 kg (202 lb)     Flowsheet Rows      First Filed Value   Admission Height  162.6 cm (64\") Documented at " 08/19/2019 0955   Admission Weight  91.6 kg (202 lb) Documented at 08/19/2019 0955          --------------------------------------------------------------------------------    Current Outpatient Medications   Medication Sig Dispense Refill   • diphenhydrAMINE (BENADRYL) 25 mg capsule Take 25 mg by mouth. Daily for allergies     •  MG tablet TAKE ONE TABLET BY MOUTH EVERY 6 HOURS AS NEEDED FOR MILD PAIN (1-3) 90 tablet 2   • Multiple Vitamins-Minerals (MULTIVITAMIN ADULT PO) Take  by mouth daily.     • naproxen sodium (ALEVE) 220 MG tablet Take 220 mg by mouth.     • SUMAtriptan (IMITREX) 100 MG tablet Take 1 tablet by mouth 1 (One) Time As Needed for Migraine for up to 1 dose. 9 tablet 6   • aspirin 81 MG EC tablet Take 81 mg by mouth daily.       Current Facility-Administered Medications   Medication Dose Route Frequency Provider Last Rate Last Dose   • fentaNYL citrate (PF) (SUBLIMAZE) injection 50 mcg  50 mcg Intravenous PRN Reginaldo Villegas MD       • iopamidol (ISOVUE-M 200) injection 41%  3 mL Epidural Once in imaging Reginaldo Villegas MD       • lidocaine (XYLOCAINE) 1 % injection 0.5 mL  0.5 mL Intradermal Once PRN Reginaldo Villegas MD       • lidocaine (XYLOCAINE) 1 % injection 1 mL  1 mL Intradermal Once Reginaldo Villegas MD       • methylPREDNISolone acetate (DEPO-medrol) injection 80 mg  80 mg Intramuscular Once Reginaldo Villegas MD       • midazolam (VERSED) injection 1 mg  1 mg Intravenous Q5 Min PRN Reginaldo Villegas MD       • sodium chloride 0.9 % flush 3 mL  3 mL Intravenous Q12H Reginaldo Villegas MD       • sodium chloride 0.9 % flush 3-10 mL  3-10 mL Intravenous PRN Reginaldo Villegas MD           --------------------------------------------------------------------------------  Assessment/Plan      Anesthesia Evaluation     Patient summary reviewed and Nursing notes reviewed           Modalities previously tried to control pain with limited effectiveness within the last 4-6 weeks: Rest, OTC  medications, Prescription medications and Physical therapy     Airway   Mallampati: II  Dental - normal exam     Pulmonary - negative pulmonary ROS and normal exam   Cardiovascular - negative cardio ROS and normal exam        Neuro/Psych- neuro exam normal  (+) numbness,     GI/Hepatic/Renal/Endo    (+) obesity,       Musculoskeletal (-) negative ROS and normal exam    Abdominal  - normal exam   Substance History - negative use     OB/GYN negative ob/gyn ROS         Other                   Diagnosis and Plan    Treatment Plan  ASA 2      Procedures: Lumbar Epidural Steroid Injection(LESI), With fluoroscopy,       Anesthetic plan and risks discussed with patient.          Diagnosis     * Lumbar disc displacement without myelopathy [M51.26]            CHIEF COMPLAINT:       HISTORY OF PRESENT ILLNESS:      PAST MEDICAL HISTORY:  Current Outpatient Medications on File Prior to Encounter   Medication Sig Dispense Refill   • diphenhydrAMINE (BENADRYL) 25 mg capsule Take 25 mg by mouth. Daily for allergies     •  MG tablet TAKE ONE TABLET BY MOUTH EVERY 6 HOURS AS NEEDED FOR MILD PAIN (1-3) 90 tablet 2   • Multiple Vitamins-Minerals (MULTIVITAMIN ADULT PO) Take  by mouth daily.     • naproxen sodium (ALEVE) 220 MG tablet Take 220 mg by mouth.     • SUMAtriptan (IMITREX) 100 MG tablet Take 1 tablet by mouth 1 (One) Time As Needed for Migraine for up to 1 dose. 9 tablet 6   • aspirin 81 MG EC tablet Take 81 mg by mouth daily.     • [DISCONTINUED] galcanezumab-gnlm (EMGALITY) 120 MG/ML prefilled syringe Inject 120 mg under the skin into the appropriate area as directed.     • [DISCONTINUED] methylPREDNISolone (MEDROL, DEMARIO,) 4 MG tablet Take as directed on package instructions. 1 each 0   • [DISCONTINUED] traMADol (ULTRAM) 50 MG tablet TAKE 1 PO EVERY 8-12 HOURS PRN PAIN 35 tablet 0     No current facility-administered medications on file prior to encounter.        Past Medical History:   Diagnosis Date   • Arthritis   "  • Cervical disc disorder    • Headache    • Low back pain    • Lumbosacral disc disease    • Peripheral neuropathy          SOCIAL HISTORY:  No tobacco    REVIEW OF SYSTEMS:  No hematologic infectious or constitutional symptoms  Other review of systems non-contributory    PHYSICAL EXAM:  /69 (BP Location: Left arm, Patient Position: Sitting)   Pulse 71   Temp 36.5 °C (97.7 °F) (Oral)   Resp 16   Ht 162.6 cm (64\")   Wt 91.6 kg (202 lb)   SpO2 96%   BMI 34.67 kg/m²   Well-developed well-nourished no acute distress  Extra ocular movements intact  Mallampati class 2 airway  Cardiac:  Regular rate and rhythm  Lungs:  Clear to auscultation bilaterally with good effort  Alert and oriented ×3  Deep tendon reflexes normal in the bilateral patella  Negative straight leg raise bilaterally  5 out of 5 strength bilateral upper and lower extremities  Lumbar spine without obvious deformities ecchymoses  Lumbar spine nontender to palpation      DIAGNOSIS:  Post-Op Diagnosis Codes:     * Lumbar disc displacement without myelopathy [M51.26]    PLAN:  1.  Lumbar 4 epidural steroid injections, up to 3, spaced 1-2 weeks apart.  If pain control is acceptable after 1 or 2 injections, it was discussed with the patient that they may return for the subsequent injections if and when their pain returns.  The risks were discussed with the patient including failure of relief, worsening pain, Headache (post dural puncture headache), bleeding (epidural hematoma) and infection (epidural abscess or skin infection).  2.  Physical therapy exercises at home as prescribed by physical therapy or from the pain clinic handout (given to the patient).  Continuation of these exercises every day, or multiple times per week, even when the patient has good pain relief, was stressed to the patient as a preventative measure to decrease the frequency and severity of future pain episodes.  3.  Continue pain medicines as already prescribed.  If patient " not currently taking any, it is recommended to begin Acetaminophen 1000 mg po q 8 hours.  If other medicines containing Acetaminophen are currently prescribed, maintain daily dose at 3000 mg.    4.  If they can tolerate NSAIDS, it is recommended to take Ibuprofen 600 mg po q 6 hours for 7 days during pain exacerbations.  Alternatively, they may substitute an NSAID of their choice (e.g. Aleve).  This may be taken at the same time as Acetaminophen.  5.  Heat and ice to the affected area as tolerated for pain control.  It was discussed that heating pads can cause burns.  6.  Daily low impact exercise such as walking or water exercise was recommended to maintain overall health and aid in weight control.   7.  Follow up as needed for subsequent injections.  8.  Patient was counseled to abstain from tobacco products.    Target : L 4-5

## 2019-08-19 NOTE — ANESTHESIA PROCEDURE NOTES
PAIN Epidural block    Pre-sedation assessment completed: 8/19/2019 10:17 AM    Patient reassessed immediately prior to procedure    Patient location during procedure: pain clinic  Start Time: 8/19/2019 10:17 AM  Stop Time: 8/19/2019 10:27 AM  Indication:at surgeon's request and procedure for pain  Performed By  Anesthesiologist: Reginaldo Villegas MD  Preanesthetic Checklist  Completed: patient identified, site marked, surgical consent, pre-op evaluation, timeout performed, IV checked, risks and benefits discussed and monitors and equipment checked  Additional Notes  Dx:  Post-Op Diagnosis Codes:     * Lumbar disc displacement without myelopathy (M51.26)  80 mg depomedrol in epid    Plan : return to clinic as needed  Prep:  Pt Position:prone (prone)  Sterile Tech:cap, gloves, mask and sterile barrier  Prep:chlorhexidine gluconate and isopropyl alcohol  Monitoring:blood pressure monitoring, EKG and continuous pulse oximetry  Procedure:Sedation: yes     Approach:left paramedian  Guidance: fluoroscopy and c arm pa and lat and loss of resistance  Location:lumbar  Level:4-5 (interlaminar)  Needle Type:Therapydia  Needle Gauge:20  Aspiration:negative  Medications:  Depomedrol:80 mg  Preservative Free Saline:3mL  Comments:No dye due to allergy  Post Assessment:  Post-procedure: bandaid.  Pt Tolerance:patient tolerated the procedure well with no apparent complications  Complications:no

## 2019-09-26 DIAGNOSIS — M54.50 LUMBAR SPINE PAIN: Primary | ICD-10-CM

## 2019-09-27 RX ORDER — TRAMADOL HYDROCHLORIDE 50 MG/1
TABLET ORAL
Qty: 21 TABLET | Refills: 0 | OUTPATIENT
Start: 2019-09-27

## 2021-07-28 ENCOUNTER — APPOINTMENT (OUTPATIENT)
Dept: WOMENS IMAGING | Facility: HOSPITAL | Age: 66
End: 2021-07-28

## 2021-07-28 PROCEDURE — 77067 SCR MAMMO BI INCL CAD: CPT | Performed by: RADIOLOGY

## 2021-07-28 PROCEDURE — 77063 BREAST TOMOSYNTHESIS BI: CPT | Performed by: RADIOLOGY

## 2021-07-30 ENCOUNTER — TELEPHONE (OUTPATIENT)
Dept: ORTHOPEDIC SURGERY | Facility: CLINIC | Age: 66
End: 2021-07-30

## 2021-07-30 NOTE — TELEPHONE ENCOUNTER
Caller: MECHELLE HART     Relationship: SELF     Best call back number: [phone]    What was the call regarding: PATIENT LAST SEEN IN 2019- RECENTLY SEEN ORTHOPEDIC DR IN [place] - PATIENT WANTS TO SCHEDULE LUMBAR  EPIDURAL; SINCE LAST VISIT WAS 2019 HUB MADE FOLLOW UP APPT WITH  PROVIDER TO DISCUSS- PATIENT MENTIONED SHE WAS  UNSURE IF DR MONTAGUE CAN REFILL PAIN MEDICATION- PATIENT DID DECLINE EARLIER APPT DATE- PLEASE CALL PATIENT BACK TO DISCUSS     Do you require a callback: YES

## 2021-07-30 NOTE — TELEPHONE ENCOUNTER
Called patient she is aware we can not place any new orders for ANYTHING until she is seen in the office.

## 2021-09-29 ENCOUNTER — OFFICE VISIT (OUTPATIENT)
Dept: ORTHOPEDIC SURGERY | Facility: CLINIC | Age: 66
End: 2021-09-29

## 2021-09-29 VITALS — TEMPERATURE: 98.4 F | WEIGHT: 200 LBS | BODY MASS INDEX: 34.15 KG/M2 | HEIGHT: 64 IN

## 2021-09-29 DIAGNOSIS — M48.061 SPINAL STENOSIS OF LUMBAR REGION WITHOUT NEUROGENIC CLAUDICATION: ICD-10-CM

## 2021-09-29 DIAGNOSIS — R52 PAIN: Primary | ICD-10-CM

## 2021-09-29 PROCEDURE — 72100 X-RAY EXAM L-S SPINE 2/3 VWS: CPT | Performed by: ORTHOPAEDIC SURGERY

## 2021-09-29 PROCEDURE — 99213 OFFICE O/P EST LOW 20 MIN: CPT | Performed by: ORTHOPAEDIC SURGERY

## 2021-09-29 RX ORDER — GABAPENTIN 300 MG/1
CAPSULE ORAL
COMMUNITY
Start: 2021-06-03 | End: 2021-10-11

## 2021-09-29 RX ORDER — VITAMIN B COMPLEX
1 CAPSULE ORAL DAILY
COMMUNITY

## 2021-09-29 RX ORDER — MELOXICAM 15 MG/1
15 TABLET ORAL
COMMUNITY
Start: 2021-04-13 | End: 2021-10-11

## 2021-09-29 NOTE — PROGRESS NOTES
New patient or new problem visit    CC: Low back pain    HPI: History of back pain and prior radiculopathy but none of late.  She has been in [place] for a while.  Mostly back pain at this point.  Its been better in the last week or 2.  No fever chills or weight loss.    PFSH: See attached    ROS: As above    PE: Mild lumbar tenderness to deep palpation.  Good strength in the legs bilaterally.    XRAY: Plain film x-rays show excellent maintenance of lordosis and mild degenerative changes at L5-S1.  No change from prior films.  MRI scan report of the images done in [facility] suggest small synovial cyst and mild degenerative and stenotic changes at L4-5 and 5 1.  Not too bad overall.    Other: n/a    Impression: Lumbar disc degeneration and asymptomatic mild spinal stenosis    Plan: She will do some stretching exercises and let me know if and when she wants to try epidurals and/or facet blocks through pain management.  I will see her as needed

## 2021-10-11 ENCOUNTER — PRE-ADMISSION TESTING (OUTPATIENT)
Dept: PREADMISSION TESTING | Facility: HOSPITAL | Age: 66
End: 2021-10-11

## 2021-10-11 VITALS
HEART RATE: 79 BPM | SYSTOLIC BLOOD PRESSURE: 142 MMHG | DIASTOLIC BLOOD PRESSURE: 79 MMHG | OXYGEN SATURATION: 99 % | HEIGHT: 65 IN | BODY MASS INDEX: 32.65 KG/M2 | RESPIRATION RATE: 20 BRPM | TEMPERATURE: 98.2 F | WEIGHT: 196 LBS

## 2021-10-11 LAB
ANION GAP SERPL CALCULATED.3IONS-SCNC: 11.2 MMOL/L (ref 5–15)
BASOPHILS # BLD AUTO: 0.02 10*3/MM3 (ref 0–0.2)
BASOPHILS NFR BLD AUTO: 0.4 % (ref 0–1.5)
BUN SERPL-MCNC: 14 MG/DL (ref 8–23)
BUN/CREAT SERPL: 20.3 (ref 7–25)
CALCIUM SPEC-SCNC: 9.3 MG/DL (ref 8.6–10.5)
CHLORIDE SERPL-SCNC: 106 MMOL/L (ref 98–107)
CO2 SERPL-SCNC: 23.8 MMOL/L (ref 22–29)
CREAT SERPL-MCNC: 0.69 MG/DL (ref 0.57–1)
DEPRECATED RDW RBC AUTO: 47.5 FL (ref 37–54)
EOSINOPHIL # BLD AUTO: 0.07 10*3/MM3 (ref 0–0.4)
EOSINOPHIL NFR BLD AUTO: 1.5 % (ref 0.3–6.2)
ERYTHROCYTE [DISTWIDTH] IN BLOOD BY AUTOMATED COUNT: 13.9 % (ref 12.3–15.4)
GFR SERPL CREATININE-BSD FRML MDRD: 103 ML/MIN/1.73
GLUCOSE SERPL-MCNC: 102 MG/DL (ref 65–99)
HCT VFR BLD AUTO: 36.8 % (ref 34–46.6)
HGB BLD-MCNC: 12.3 G/DL (ref 12–15.9)
IMM GRANULOCYTES # BLD AUTO: 0.01 10*3/MM3 (ref 0–0.05)
IMM GRANULOCYTES NFR BLD AUTO: 0.2 % (ref 0–0.5)
LYMPHOCYTES # BLD AUTO: 2.46 10*3/MM3 (ref 0.7–3.1)
LYMPHOCYTES NFR BLD AUTO: 51.4 % (ref 19.6–45.3)
MCH RBC QN AUTO: 31.5 PG (ref 26.6–33)
MCHC RBC AUTO-ENTMCNC: 33.4 G/DL (ref 31.5–35.7)
MCV RBC AUTO: 94.4 FL (ref 79–97)
MONOCYTES # BLD AUTO: 0.32 10*3/MM3 (ref 0.1–0.9)
MONOCYTES NFR BLD AUTO: 6.7 % (ref 5–12)
NEUTROPHILS NFR BLD AUTO: 1.91 10*3/MM3 (ref 1.7–7)
NEUTROPHILS NFR BLD AUTO: 39.8 % (ref 42.7–76)
NRBC BLD AUTO-RTO: 0 /100 WBC (ref 0–0.2)
PLATELET # BLD AUTO: 235 10*3/MM3 (ref 140–450)
PMV BLD AUTO: 10.9 FL (ref 6–12)
POTASSIUM SERPL-SCNC: 4.2 MMOL/L (ref 3.5–5.2)
QT INTERVAL: 412 MS
RBC # BLD AUTO: 3.9 10*6/MM3 (ref 3.77–5.28)
SARS-COV-2 RNA PNL SPEC NAA+PROBE: NOT DETECTED
SODIUM SERPL-SCNC: 141 MMOL/L (ref 136–145)
WBC # BLD AUTO: 4.79 10*3/MM3 (ref 3.4–10.8)

## 2021-10-11 PROCEDURE — 36415 COLL VENOUS BLD VENIPUNCTURE: CPT

## 2021-10-11 PROCEDURE — 85025 COMPLETE CBC W/AUTO DIFF WBC: CPT

## 2021-10-11 PROCEDURE — C9803 HOPD COVID-19 SPEC COLLECT: HCPCS

## 2021-10-11 PROCEDURE — 93005 ELECTROCARDIOGRAM TRACING: CPT

## 2021-10-11 PROCEDURE — 80048 BASIC METABOLIC PNL TOTAL CA: CPT

## 2021-10-11 PROCEDURE — 87635 SARS-COV-2 COVID-19 AMP PRB: CPT

## 2021-10-11 PROCEDURE — 93010 ELECTROCARDIOGRAM REPORT: CPT | Performed by: INTERNAL MEDICINE

## 2021-10-11 RX ORDER — ALPHA-D-GALACTOSIDASE
2 TABLET ORAL AS NEEDED
COMMUNITY

## 2021-10-11 NOTE — DISCHARGE INSTRUCTIONS
Take the following medications the morning of surgery:    none    If you are on prescription narcotic pain medication to control your pain you may also take that medication the morning of surgery.    General Instructions:  • Do not eat solid food after midnight the night before surgery.  • You may drink clear liquids day of surgery but must stop at least one hour before your hospital arrival time.  • It is beneficial for you to have a clear drink that contains carbohydrates the day of surgery.  We suggest a 12 to 20 ounce bottle of Gatorade or Powerade for non-diabetic patients or a 12 to 20 ounce bottle of G2 or Powerade Zero for diabetic patients. (Pediatric patients, are not advised to drink a 12 to 20 ounce carbohydrate drink)    Clear liquids are liquids you can see through.  Nothing red in color.     Plain water                               Sports drinks  Sodas                                   Gelatin (Jell-O)  Fruit juices without pulp such as white grape juice and apple juice  Popsicles that contain no fruit or yogurt  Tea or coffee (no cream or milk added)  Gatorade / Powerade  G2 / Powerade Zero    • Infants may have breast milk up to four hours before surgery.  • Infants drinking formula may drink formula up to six hours before surgery.   • Patients who avoid smoking, chewing tobacco and alcohol for 4 weeks prior to surgery have a reduced risk of post-operative complications.  Quit smoking as many days before surgery as you can.  • Do not smoke, use chewing tobacco or drink alcohol the day of surgery.   • If applicable bring your C-PAP/ BI-PAP machine.  • Bring any papers given to you in the doctor’s office.  • Wear clean comfortable clothes.  • Do not wear contact lenses, false eyelashes or make-up.  Bring a case for your glasses.   • Bring crutches or walker if applicable.  • Remove all piercings.  Leave jewelry and any other valuables at home.  • Hair extensions with metal clips must be removed prior  Wound Consult to surgery.  • The Pre-Admission Testing nurse will instruct you to bring medications if unable to obtain an accurate list in Pre-Admission Testing.        If you were given a blood bank ID arm band remember to bring it with you the day of surgery.    Preventing a Surgical Site Infection:  • For 2 to 3 days before surgery, avoid shaving with a razor because the razor can irritate skin and make it easier to develop an infection.    • Any areas of open skin can increase the risk of a post-operative wound infection by allowing bacteria to enter and travel throughout the body.  Notify your surgeon if you have any skin wounds / rashes even if it is not near the expected surgical site.  The area will need assessed to determine if surgery should be delayed until it is healed.  • The night prior to surgery shower using a fresh bar of anti-bacterial soap (such as Dial) and clean washcloth.  Sleep in a clean bed with clean clothing.  Do not allow pets to sleep with you.  • Shower on the morning of surgery using a fresh bar of anti-bacterial soap (such as Dial) and clean washcloth.  Dry with a clean towel and dress in clean clothing.  • Ask your surgeon if you will be receiving antibiotics prior to surgery.  • Make sure you, your family, and all healthcare providers clean their hands with soap and water or an alcohol based hand  before caring for you or your wound.    Day of surgery:10/13/2021   0530  Your arrival time is approximately two hours before your scheduled surgery time.  Upon arrival, a Pre-op nurse and Anesthesiologist will review your health history, obtain vital signs, and answer questions you may have.  The only belongings needed at this time will be a list of your home medications and if applicable your C-PAP/BI-PAP machine.  A Pre-op nurse will start an IV and you may receive medication in preparation for surgery, including something to help you relax.     Please be aware that surgery does come with  discomfort.  We want to make every effort to control your discomfort so please discuss any uncontrolled symptoms with your nurse.   Your doctor will most likely have prescribed pain medications.      If you are going home after surgery you will receive individualized written care instructions before being discharged.  A responsible adult must drive you to and from the hospital on the day of your surgery and stay with you for 24 hours.  Discharge prescriptions can be filled by the hospital pharmacy during regular pharmacy hours.  If you are having surgery late in the day/evening your prescription may be e-prescribed to your pharmacy.  Please verify your pharmacy hours or chose a 24 hour pharmacy to avoid not having access to your prescription because your pharmacy has closed for the day.    If you are staying overnight following surgery, you will be transported to your hospital room following the recovery period.  [facility] has all private rooms.    If you have any questions please call Pre-Admission Testing at [phone].  Deductibles and co-payments are collected on the day of service. Please be prepared to pay the required co-pay, deductible or deposit on the day of service as defined by your plan.    Patient Education for Self-Quarantine Process    • Following your COVID testing, we strongly recommend that you wear a mask when you are with other people and practice social distancing.   • Limit your activities to only required outings.  • Wash your hands with soap and water frequently for at least 20 seconds.   • Avoid touching your eyes, nose and mouth with unwashed hands.  • Do not share anything - utensils, drinking glasses, food from the same bowl.   • Sanitize household surfaces daily. Include all high touch areas (door handles, light switches, phones, countertops, etc.)    Call your surgeon immediately if you experience any of the following symptoms:  • Sore Throat  • Shortness of Breath  or difficulty breathing  • Cough  • Chills  • Body soreness or muscle pain  • Headache  • Fever  • New loss of taste or smell  • Do not arrive for your surgery ill.  Your procedure will need to be rescheduled to another time.  You will need to call your physician before the day of surgery to avoid any unnecessary exposure to hospital staff as well as other patients.    Take the following medications the morning of surgery:      If you are on prescription narcotic pain medication to control your pain you may also take that medication the morning of surgery.    General Instructions:  • Do not eat solid food after midnight the night before surgery.  • You may drink clear liquids day of surgery but must stop at least one hour before your hospital arrival time.  • It is beneficial for you to have a clear drink that contains carbohydrates the day of surgery.  We suggest a 12 to 20 ounce bottle of Gatorade or Powerade for non-diabetic patients or a 12 to 20 ounce bottle of G2 or Powerade Zero for diabetic patients. (Pediatric patients, are not advised to drink a 12 to 20 ounce carbohydrate drink)    Clear liquids are liquids you can see through.  Nothing red in color.     Plain water                               Sports drinks  Sodas                                   Gelatin (Jell-O)  Fruit juices without pulp such as white grape juice and apple juice  Popsicles that contain no fruit or yogurt  Tea or coffee (no cream or milk added)  Gatorade / Powerade  G2 / Powerade Zero    • Infants may have breast milk up to four hours before surgery.  • Infants drinking formula may drink formula up to six hours before surgery.   • Patients who avoid smoking, chewing tobacco and alcohol for 4 weeks prior to surgery have a reduced risk of post-operative complications.  Quit smoking as many days before surgery as you can.  • Do not smoke, use chewing tobacco or drink alcohol the day of surgery.   • If applicable bring your C-PAP/ BI-PAP  machine.  • Bring any papers given to you in the doctor’s office.  • Wear clean comfortable clothes.  • Do not wear contact lenses, false eyelashes or make-up.  Bring a case for your glasses.   • Bring crutches or walker if applicable.  • Remove all piercings.  Leave jewelry and any other valuables at home.  • Hair extensions with metal clips must be removed prior to surgery.  • The Pre-Admission Testing nurse will instruct you to bring medications if unable to obtain an accurate list in Pre-Admission Testing.        If you were given a blood bank ID arm band remember to bring it with you the day of surgery.    Preventing a Surgical Site Infection:  • For 2 to 3 days before surgery, avoid shaving with a razor because the razor can irritate skin and make it easier to develop an infection.    • Any areas of open skin can increase the risk of a post-operative wound infection by allowing bacteria to enter and travel throughout the body.  Notify your surgeon if you have any skin wounds / rashes even if it is not near the expected surgical site.  The area will need assessed to determine if surgery should be delayed until it is healed.  • The night prior to surgery shower using a fresh bar of anti-bacterial soap (such as Dial) and clean washcloth.  Sleep in a clean bed with clean clothing.  Do not allow pets to sleep with you.  • Shower on the morning of surgery using a fresh bar of anti-bacterial soap (such as Dial) and clean washcloth.  Dry with a clean towel and dress in clean clothing.  • Ask your surgeon if you will be receiving antibiotics prior to surgery.  • Make sure you, your family, and all healthcare providers clean their hands with soap and water or an alcohol based hand  before caring for you or your wound.    Day of surgery:10/13/2021   0530  Your arrival time is approximately two hours before your scheduled surgery time.  Upon arrival, a Pre-op nurse and Anesthesiologist will review your health  history, obtain vital signs, and answer questions you may have.  The only belongings needed at this time will be a list of your home medications and if applicable your C-PAP/BI-PAP machine.  A Pre-op nurse will start an IV and you may receive medication in preparation for surgery, including something to help you relax.     Please be aware that surgery does come with discomfort.  We want to make every effort to control your discomfort so please discuss any uncontrolled symptoms with your nurse.   Your doctor will most likely have prescribed pain medications.      If you are going home after surgery you will receive individualized written care instructions before being discharged.  A responsible adult must drive you to and from the hospital on the day of your surgery and stay with you for 24 hours.  Discharge prescriptions can be filled by the hospital pharmacy during regular pharmacy hours.  If you are having surgery late in the day/evening your prescription may be e-prescribed to your pharmacy.  Please verify your pharmacy hours or chose a 24 hour pharmacy to avoid not having access to your prescription because your pharmacy has closed for the day.    If you are staying overnight following surgery, you will be transported to your hospital room following the recovery period.  [facility] has all private rooms.    If you have any questions please call Pre-Admission Testing at [phone].  Deductibles and co-payments are collected on the day of service. Please be prepared to pay the required co-pay, deductible or deposit on the day of service as defined by your plan.    Patient Education for Self-Quarantine Process    • Following your COVID testing, we strongly recommend that you wear a mask when you are with other people and practice social distancing.   • Limit your activities to only required outings.  • Wash your hands with soap and water frequently for at least 20 seconds.   • Avoid touching your  eyes, nose and mouth with unwashed hands.  • Do not share anything - utensils, drinking glasses, food from the same bowl.   • Sanitize household surfaces daily. Include all high touch areas (door handles, light switches, phones, countertops, etc.)    Call your surgeon immediately if you experience any of the following symptoms:  • Sore Throat  • Shortness of Breath or difficulty breathing  • Cough  • Chills  • Body soreness or muscle pain  • Headache  • Fever  • New loss of taste or smell  • Do not arrive for your surgery ill.  Your procedure will need to be rescheduled to another time.  You will need to call your physician before the day of surgery to avoid any unnecessary exposure to hospital staff as well as other patients.

## 2021-10-12 RX ORDER — CEFAZOLIN SODIUM 2 G/100ML
2 INJECTION, SOLUTION INTRAVENOUS ONCE
Status: COMPLETED | OUTPATIENT
Start: 2021-10-13 | End: 2021-10-13

## 2021-10-12 RX ORDER — ACETAMINOPHEN 500 MG
1000 TABLET ORAL
Status: COMPLETED | OUTPATIENT
Start: 2021-10-12 | End: 2021-10-13

## 2021-10-13 ENCOUNTER — ANESTHESIA EVENT (OUTPATIENT)
Dept: PERIOP | Facility: HOSPITAL | Age: 66
End: 2021-10-13

## 2021-10-13 ENCOUNTER — ANESTHESIA (OUTPATIENT)
Dept: PERIOP | Facility: HOSPITAL | Age: 66
End: 2021-10-13

## 2021-10-13 ENCOUNTER — HOSPITAL ENCOUNTER (OUTPATIENT)
Facility: HOSPITAL | Age: 66
Setting detail: HOSPITAL OUTPATIENT SURGERY
Discharge: HOME | End: 2021-10-13
Attending: OBSTETRICS & GYNECOLOGY | Admitting: OBSTETRICS & GYNECOLOGY

## 2021-10-13 VITALS
SYSTOLIC BLOOD PRESSURE: 133 MMHG | HEART RATE: 58 BPM | OXYGEN SATURATION: 100 % | DIASTOLIC BLOOD PRESSURE: 71 MMHG | TEMPERATURE: 98 F | RESPIRATION RATE: 16 BRPM

## 2021-10-13 DIAGNOSIS — N95.0 POST-MENOPAUSAL BLEEDING: ICD-10-CM

## 2021-10-13 DIAGNOSIS — G89.18 POST-OP PAIN: Primary | ICD-10-CM

## 2021-10-13 PROCEDURE — 25010000002 ONDANSETRON PER 1 MG: Performed by: NURSE ANESTHETIST, CERTIFIED REGISTERED

## 2021-10-13 PROCEDURE — 25010000002 PROPOFOL 10 MG/ML EMULSION: Performed by: NURSE ANESTHETIST, CERTIFIED REGISTERED

## 2021-10-13 PROCEDURE — 88305 TISSUE EXAM BY PATHOLOGIST: CPT | Performed by: OBSTETRICS & GYNECOLOGY

## 2021-10-13 PROCEDURE — 25010000003 CEFAZOLIN IN DEXTROSE 2-4 GM/100ML-% SOLUTION: Performed by: OBSTETRICS & GYNECOLOGY

## 2021-10-13 PROCEDURE — C1782 MORCELLATOR: HCPCS | Performed by: OBSTETRICS & GYNECOLOGY

## 2021-10-13 PROCEDURE — 25010000002 KETOROLAC TROMETHAMINE PER 15 MG: Performed by: NURSE ANESTHETIST, CERTIFIED REGISTERED

## 2021-10-13 PROCEDURE — 25010000002 PHENYLEPHRINE 10 MG/ML SOLUTION: Performed by: NURSE ANESTHETIST, CERTIFIED REGISTERED

## 2021-10-13 PROCEDURE — 25010000002 FENTANYL CITRATE (PF) 50 MCG/ML SOLUTION: Performed by: NURSE ANESTHETIST, CERTIFIED REGISTERED

## 2021-10-13 PROCEDURE — 25010000002 DEXAMETHASONE PER 1 MG: Performed by: NURSE ANESTHETIST, CERTIFIED REGISTERED

## 2021-10-13 RX ORDER — FLUMAZENIL 0.1 MG/ML
0.2 INJECTION INTRAVENOUS AS NEEDED
Status: DISCONTINUED | OUTPATIENT
Start: 2021-10-13 | End: 2021-10-13 | Stop reason: HOSPADM

## 2021-10-13 RX ORDER — EPHEDRINE SULFATE 50 MG/ML
5 INJECTION, SOLUTION INTRAVENOUS ONCE AS NEEDED
Status: DISCONTINUED | OUTPATIENT
Start: 2021-10-13 | End: 2021-10-13 | Stop reason: HOSPADM

## 2021-10-13 RX ORDER — TRAMADOL HYDROCHLORIDE 50 MG/1
50 TABLET ORAL EVERY 6 HOURS PRN
Qty: 6 TABLET | Refills: 0 | Status: SHIPPED | OUTPATIENT
Start: 2021-10-13 | End: 2021-11-11

## 2021-10-13 RX ORDER — NALOXONE HCL 0.4 MG/ML
0.2 VIAL (ML) INJECTION AS NEEDED
Status: DISCONTINUED | OUTPATIENT
Start: 2021-10-13 | End: 2021-10-13 | Stop reason: HOSPADM

## 2021-10-13 RX ORDER — DROPERIDOL 2.5 MG/ML
0.62 INJECTION, SOLUTION INTRAMUSCULAR; INTRAVENOUS ONCE AS NEEDED
Status: DISCONTINUED | OUTPATIENT
Start: 2021-10-13 | End: 2021-10-13 | Stop reason: HOSPADM

## 2021-10-13 RX ORDER — KETOROLAC TROMETHAMINE 30 MG/ML
INJECTION, SOLUTION INTRAMUSCULAR; INTRAVENOUS AS NEEDED
Status: DISCONTINUED | OUTPATIENT
Start: 2021-10-13 | End: 2021-10-13 | Stop reason: SURG

## 2021-10-13 RX ORDER — OXYCODONE AND ACETAMINOPHEN 10; 325 MG/1; MG/1
1 TABLET ORAL EVERY 4 HOURS PRN
Status: DISCONTINUED | OUTPATIENT
Start: 2021-10-13 | End: 2021-10-13 | Stop reason: HOSPADM

## 2021-10-13 RX ORDER — PROPOFOL 10 MG/ML
VIAL (ML) INTRAVENOUS AS NEEDED
Status: DISCONTINUED | OUTPATIENT
Start: 2021-10-13 | End: 2021-10-13 | Stop reason: SURG

## 2021-10-13 RX ORDER — DEXAMETHASONE SODIUM PHOSPHATE 10 MG/ML
INJECTION INTRAMUSCULAR; INTRAVENOUS AS NEEDED
Status: DISCONTINUED | OUTPATIENT
Start: 2021-10-13 | End: 2021-10-13 | Stop reason: SURG

## 2021-10-13 RX ORDER — FAMOTIDINE 10 MG/ML
20 INJECTION, SOLUTION INTRAVENOUS ONCE
Status: COMPLETED | OUTPATIENT
Start: 2021-10-13 | End: 2021-10-13

## 2021-10-13 RX ORDER — PHENYLEPHRINE HYDROCHLORIDE 10 MG/ML
INJECTION INTRAVENOUS AS NEEDED
Status: DISCONTINUED | OUTPATIENT
Start: 2021-10-13 | End: 2021-10-13 | Stop reason: SURG

## 2021-10-13 RX ORDER — DIPHENHYDRAMINE HYDROCHLORIDE 50 MG/ML
12.5 INJECTION INTRAMUSCULAR; INTRAVENOUS
Status: DISCONTINUED | OUTPATIENT
Start: 2021-10-13 | End: 2021-10-13 | Stop reason: HOSPADM

## 2021-10-13 RX ORDER — DIPHENHYDRAMINE HCL 25 MG
25 CAPSULE ORAL
Status: DISCONTINUED | OUTPATIENT
Start: 2021-10-13 | End: 2021-10-13 | Stop reason: HOSPADM

## 2021-10-13 RX ORDER — IBUPROFEN 600 MG/1
600 TABLET ORAL ONCE AS NEEDED
Status: DISCONTINUED | OUTPATIENT
Start: 2021-10-13 | End: 2021-10-13 | Stop reason: HOSPADM

## 2021-10-13 RX ORDER — SODIUM CHLORIDE 0.9 % (FLUSH) 0.9 %
3-10 SYRINGE (ML) INJECTION AS NEEDED
Status: DISCONTINUED | OUTPATIENT
Start: 2021-10-13 | End: 2021-10-13 | Stop reason: HOSPADM

## 2021-10-13 RX ORDER — LABETALOL HYDROCHLORIDE 5 MG/ML
5 INJECTION, SOLUTION INTRAVENOUS
Status: DISCONTINUED | OUTPATIENT
Start: 2021-10-13 | End: 2021-10-13 | Stop reason: HOSPADM

## 2021-10-13 RX ORDER — FENTANYL CITRATE 50 UG/ML
INJECTION, SOLUTION INTRAMUSCULAR; INTRAVENOUS AS NEEDED
Status: DISCONTINUED | OUTPATIENT
Start: 2021-10-13 | End: 2021-10-13 | Stop reason: SURG

## 2021-10-13 RX ORDER — MIDAZOLAM HYDROCHLORIDE 1 MG/ML
0.5 INJECTION INTRAMUSCULAR; INTRAVENOUS
Status: DISCONTINUED | OUTPATIENT
Start: 2021-10-13 | End: 2021-10-13 | Stop reason: HOSPADM

## 2021-10-13 RX ORDER — LIDOCAINE HYDROCHLORIDE 10 MG/ML
0.5 INJECTION, SOLUTION EPIDURAL; INFILTRATION; INTRACAUDAL; PERINEURAL ONCE AS NEEDED
Status: DISCONTINUED | OUTPATIENT
Start: 2021-10-13 | End: 2021-10-13 | Stop reason: HOSPADM

## 2021-10-13 RX ORDER — ONDANSETRON 2 MG/ML
4 INJECTION INTRAMUSCULAR; INTRAVENOUS ONCE AS NEEDED
Status: DISCONTINUED | OUTPATIENT
Start: 2021-10-13 | End: 2021-10-13 | Stop reason: HOSPADM

## 2021-10-13 RX ORDER — ONDANSETRON 2 MG/ML
INJECTION INTRAMUSCULAR; INTRAVENOUS AS NEEDED
Status: DISCONTINUED | OUTPATIENT
Start: 2021-10-13 | End: 2021-10-13 | Stop reason: SURG

## 2021-10-13 RX ORDER — LIDOCAINE HYDROCHLORIDE 20 MG/ML
INJECTION, SOLUTION INFILTRATION; PERINEURAL AS NEEDED
Status: DISCONTINUED | OUTPATIENT
Start: 2021-10-13 | End: 2021-10-13 | Stop reason: SURG

## 2021-10-13 RX ORDER — SODIUM CHLORIDE, SODIUM LACTATE, POTASSIUM CHLORIDE, CALCIUM CHLORIDE 600; 310; 30; 20 MG/100ML; MG/100ML; MG/100ML; MG/100ML
9 INJECTION, SOLUTION INTRAVENOUS CONTINUOUS
Status: DISCONTINUED | OUTPATIENT
Start: 2021-10-13 | End: 2021-10-13 | Stop reason: HOSPADM

## 2021-10-13 RX ORDER — SODIUM CHLORIDE 0.9 % (FLUSH) 0.9 %
3 SYRINGE (ML) INJECTION EVERY 12 HOURS SCHEDULED
Status: DISCONTINUED | OUTPATIENT
Start: 2021-10-13 | End: 2021-10-13 | Stop reason: HOSPADM

## 2021-10-13 RX ORDER — IPRATROPIUM BROMIDE AND ALBUTEROL SULFATE 2.5; .5 MG/3ML; MG/3ML
3 SOLUTION RESPIRATORY (INHALATION) ONCE AS NEEDED
Status: DISCONTINUED | OUTPATIENT
Start: 2021-10-13 | End: 2021-10-13 | Stop reason: HOSPADM

## 2021-10-13 RX ORDER — LIDOCAINE HYDROCHLORIDE AND EPINEPHRINE 10; 10 MG/ML; UG/ML
INJECTION, SOLUTION INFILTRATION; PERINEURAL AS NEEDED
Status: DISCONTINUED | OUTPATIENT
Start: 2021-10-13 | End: 2021-10-13 | Stop reason: HOSPADM

## 2021-10-13 RX ORDER — FENTANYL CITRATE 50 UG/ML
50 INJECTION, SOLUTION INTRAMUSCULAR; INTRAVENOUS
Status: DISCONTINUED | OUTPATIENT
Start: 2021-10-13 | End: 2021-10-13 | Stop reason: HOSPADM

## 2021-10-13 RX ORDER — MAGNESIUM HYDROXIDE 1200 MG/15ML
LIQUID ORAL AS NEEDED
Status: DISCONTINUED | OUTPATIENT
Start: 2021-10-13 | End: 2021-10-13 | Stop reason: HOSPADM

## 2021-10-13 RX ORDER — HYDROCODONE BITARTRATE AND ACETAMINOPHEN 7.5; 325 MG/1; MG/1
1 TABLET ORAL ONCE AS NEEDED
Status: DISCONTINUED | OUTPATIENT
Start: 2021-10-13 | End: 2021-10-13 | Stop reason: HOSPADM

## 2021-10-13 RX ORDER — HYDROMORPHONE HYDROCHLORIDE 1 MG/ML
0.5 INJECTION, SOLUTION INTRAMUSCULAR; INTRAVENOUS; SUBCUTANEOUS
Status: DISCONTINUED | OUTPATIENT
Start: 2021-10-13 | End: 2021-10-13 | Stop reason: HOSPADM

## 2021-10-13 RX ORDER — SODIUM CHLORIDE, SODIUM LACTATE, POTASSIUM CHLORIDE, CALCIUM CHLORIDE 600; 310; 30; 20 MG/100ML; MG/100ML; MG/100ML; MG/100ML
100 INJECTION, SOLUTION INTRAVENOUS CONTINUOUS
Status: DISCONTINUED | OUTPATIENT
Start: 2021-10-13 | End: 2021-10-13 | Stop reason: HOSPADM

## 2021-10-13 RX ORDER — HYDRALAZINE HYDROCHLORIDE 20 MG/ML
5 INJECTION INTRAMUSCULAR; INTRAVENOUS
Status: DISCONTINUED | OUTPATIENT
Start: 2021-10-13 | End: 2021-10-13 | Stop reason: HOSPADM

## 2021-10-13 RX ORDER — GLYCOPYRROLATE 0.2 MG/ML
INJECTION INTRAMUSCULAR; INTRAVENOUS AS NEEDED
Status: DISCONTINUED | OUTPATIENT
Start: 2021-10-13 | End: 2021-10-13 | Stop reason: SURG

## 2021-10-13 RX ADMIN — KETOROLAC TROMETHAMINE 30 MG: 30 INJECTION, SOLUTION INTRAMUSCULAR at 07:51

## 2021-10-13 RX ADMIN — FENTANYL CITRATE 50 MCG: 50 INJECTION INTRAMUSCULAR; INTRAVENOUS at 07:28

## 2021-10-13 RX ADMIN — ACETAMINOPHEN 1000 MG: 500 TABLET, FILM COATED ORAL at 06:34

## 2021-10-13 RX ADMIN — GLYCOPYRROLATE 0.2 MG: 0.2 INJECTION INTRAMUSCULAR; INTRAVENOUS at 07:37

## 2021-10-13 RX ADMIN — SODIUM CHLORIDE, POTASSIUM CHLORIDE, SODIUM LACTATE AND CALCIUM CHLORIDE 9 ML/HR: 600; 310; 30; 20 INJECTION, SOLUTION INTRAVENOUS at 06:34

## 2021-10-13 RX ADMIN — PHENYLEPHRINE HYDROCHLORIDE 100 MCG: 10 INJECTION, SOLUTION INTRAVENOUS at 07:35

## 2021-10-13 RX ADMIN — FENTANYL CITRATE 50 MCG: 50 INJECTION INTRAMUSCULAR; INTRAVENOUS at 07:59

## 2021-10-13 RX ADMIN — CEFAZOLIN SODIUM 2 G: 2 INJECTION, SOLUTION INTRAVENOUS at 07:28

## 2021-10-13 RX ADMIN — PROPOFOL 150 MG: 10 INJECTION, EMULSION INTRAVENOUS at 07:32

## 2021-10-13 RX ADMIN — ONDANSETRON 4 MG: 2 INJECTION INTRAMUSCULAR; INTRAVENOUS at 07:51

## 2021-10-13 RX ADMIN — LIDOCAINE HYDROCHLORIDE 100 MG: 20 INJECTION, SOLUTION INFILTRATION; PERINEURAL at 07:32

## 2021-10-13 RX ADMIN — PHENYLEPHRINE HYDROCHLORIDE 100 MCG: 10 INJECTION, SOLUTION INTRAVENOUS at 07:40

## 2021-10-13 RX ADMIN — FAMOTIDINE 20 MG: 10 INJECTION INTRAVENOUS at 06:39

## 2021-10-13 RX ADMIN — DEXAMETHASONE SODIUM PHOSPHATE 6 MG: 10 INJECTION INTRAMUSCULAR; INTRAVENOUS at 07:37

## 2021-10-13 NOTE — ANESTHESIA POSTPROCEDURE EVALUATION
Patient: Cece Bustamante    Procedure Summary     Date: 10/13/21 Room / Location:  [facility]    Anesthesia Start: 0726 Anesthesia Stop: 0804    Procedure: HYSTEROSCOPY DILATATION AND CURETTAGE  WITH MYOSURE, POLYPECTOMY (N/A Uterus) Diagnosis:     Surgeons: Oriana Weber MD Provider: Martínez Ibarra MD    Anesthesia Type: general ASA Status: 2          Anesthesia Type: general    Vitals  Vitals Value Taken Time   /82 10/13/21 0831   Temp 36.7 °C (98 °F) 10/13/21 0830   Pulse 64 10/13/21 0842   Resp 16 10/13/21 0830   SpO2 98 % 10/13/21 0842   Vitals shown include unvalidated device data.        Post Anesthesia Care and Evaluation    Patient location during evaluation: bedside  Patient participation: complete - patient participated  Level of consciousness: awake and alert  Pain management: adequate  Airway patency: patent  Anesthetic complications: No anesthetic complications    Cardiovascular status: acceptable  Respiratory status: acceptable  Hydration status: acceptable    Comments: /71 (BP Location: Left arm, Patient Position: Lying)   Pulse 58   Temp 36.7 °C (98 °F)   Resp 16   SpO2 100%

## 2021-10-13 NOTE — BRIEF OP NOTE
DILATATION AND CURETTAGE HYSTEROSCOPY WITH MORCELLATOR  Progress Note    Cece Bustamante  10/13/2021    Pre-op Diagnosis:   Popstmenopausal bleeding with thickened endometrium     Post-Op Diagnosis Codes:   uterine cavity scarring and small polyp in lower  Cavity.     Procedure/CPT® Codes:        Procedure(s):  HYSTEROSCOPY DILATATION AND CURETTAGE  WITH MYOSURE, POLYPECTOMY and paracervical block    Surgeon(s):  Oriana Weber MD    Anesthesia: General    Staff:   Circulator: Peyton Banegas RN  Scrub Person: Tran Bennett  Vendor Representative: Dorian Cruz         Estimated Blood Loss: minimal    Urine Voided: * No values recorded between 10/13/2021  7:26 AM and 10/13/2021  7:55 AM *    Specimens:                Specimens     ID Source Type Tests Collected By Collected At Frozen?    A Endometrium Curettings · TISSUE PATHOLOGY EXAM   Oriana Weber MD 10/13/21 6550 No    Description: Endometrial curettings and polyp    This specimen was not marked as sent.                Drains: * No LDAs found *    Findings: Her cavity appeared to be scarred and there was evidence of either an arcuate or partial septate uterus.  There was polypoid-like tissue in the lower uterine segment which was resected.    Complications: None          Oriana Weber MD     Date: 10/13/2021  Time: 07:55 EDT

## 2021-10-13 NOTE — ANESTHESIA PREPROCEDURE EVALUATION
Anesthesia Evaluation     Patient summary reviewed and Nursing notes reviewed   no history of anesthetic complications:  NPO Solid Status: > 6 hours  NPO Liquid Status: > 2 hours           Airway   Mallampati: II  TM distance: >3 FB  Neck ROM: full  No difficulty expected  Dental - normal exam     Pulmonary - normal exam   (+) a smoker Former, sleep apnea,   Cardiovascular - normal exam    ECG reviewed    (-) angina    ROS comment: ABNORMAL ECG -  Sinus rhythm  Probable left atrial enlargement  Borderline left axis deviation  Low voltage, precordial leads  Borderline T abnormalities, anterior leads  NO PRIOR TRACING AVAILABLE FOR COMPARISON  Electronically Signed By: Jose Lopez ([facility]) 11-Oct-2021     Neuro/Psych  GI/Hepatic/Renal/Endo    (+) obesity,       Musculoskeletal     Abdominal    Substance History      OB/GYN    (-)  Pregnant    Comment:  Bleeeding      Other                        Anesthesia Plan    ASA 2     general       Anesthetic plan, all risks, benefits, and alternatives have been provided, discussed and informed consent has been obtained with: patient.

## 2021-10-13 NOTE — OP NOTE
PREOPERATIVE DIAGNOSIS: Postmenopausal bleeding with evidence of thickened endometrium    POSTOPERATIVE DIAGNOSIS: Small polyps in the lower uterine segment that were resected.  The cavity appeared scarred and there was in the midline either septum or arcuate uterus    PROCEDURE: Paracervical block with hysteroscopy with MyoSure resection of polyp and endometrial sampling    SURGEON:  Oriana Weber MD    SURGICAL ASSISTANT: None    FINDINGS: Small polyps in the lower uterine segment.  The cavity was scarred.  There were a septum in the middle and a transverse band across the cavity.    COMPLICATIONS: None    ANESTHESIA: General    BLOOD LOSS: Minimal    DESCRIPTION OF PROCEDURE: Patient was brought to the operating room and given general anesthesia.  Her legs were placed in Jj stirrups.  Attention was placed that the leg was in line with the contralateral shoulder.  She was prepped and her bladder was emptied and then she was draped.  Prior to this on bimanual the uterus was midline.   scar in lower abdomen.  Next speculum was placed in vagina single tenaculum placed on the cervix and a paracervical block using 10 cc of 1% lidocaine with epinephrine was injected at 12 3 6 and 9.  Uterus sounded to 8 cm and was easily dilated.  The hysteroscope was inserted and using normal saline the lining was visualized.  She had polypoid-like tissue in the lower uterine segment.  There was a transverse band.  There was evidence of scarring in the cavity.  Next the MyoSure reach was used to resect the transverse band in the cavity.  There was no evidence of perforation.  What appeared to be the ostia were visualized on either side but they were in closer than 1 would expect.  I resected the polypoid-like tissue in the lower uterine segment.  She tolerated this well.  The hysteroscope was removed and the tenaculum removed.  Fluid deficit 400 cc.  Specimen sent to pathology in formalin

## 2021-10-13 NOTE — ANESTHESIA PROCEDURE NOTES
Airway  Urgency: elective    Date/Time: 10/13/2021 7:33 AM  Airway not difficult    General Information and Staff    Patient location during procedure: OR  Anesthesiologist: Martínez Ibarra MD  CRNA: Peyton Sanchez CRNA    Indications and Patient Condition  Indications for airway management: airway protection    Preoxygenated: yes  MILS maintained throughout  Mask difficulty assessment: 0 - not attempted    Final Airway Details  Final airway type: supraglottic airway      Successful airway: classic  Size 4    Number of attempts at approach: 1  Assessment: lips, teeth, and gum same as pre-op and atraumatic intubation    Additional Comments  Placed with ease. Vent with ease. Teeth as pre-op. Secured to face.

## 2021-10-14 LAB
LAB AP CASE REPORT: NORMAL
LAB AP DIAGNOSIS COMMENT: NORMAL
PATH REPORT.FINAL DX SPEC: NORMAL
PATH REPORT.GROSS SPEC: NORMAL

## 2021-11-11 ENCOUNTER — OFFICE VISIT (OUTPATIENT)
Dept: FAMILY MEDICINE CLINIC | Facility: CLINIC | Age: 66
End: 2021-11-11

## 2021-11-11 VITALS
HEART RATE: 81 BPM | WEIGHT: 188 LBS | HEIGHT: 65 IN | RESPIRATION RATE: 12 BRPM | BODY MASS INDEX: 31.32 KG/M2 | SYSTOLIC BLOOD PRESSURE: 158 MMHG | DIASTOLIC BLOOD PRESSURE: 88 MMHG | OXYGEN SATURATION: 98 %

## 2021-11-11 DIAGNOSIS — R14.3 FLATULENCE SYMPTOM: Primary | ICD-10-CM

## 2021-11-11 PROCEDURE — 99213 OFFICE O/P EST LOW 20 MIN: CPT | Performed by: NURSE PRACTITIONER

## 2021-11-11 RX ORDER — MEDROXYPROGESTERONE ACETATE 5 MG/1
TABLET ORAL
COMMUNITY
Start: 2021-11-08

## 2021-11-11 NOTE — PROGRESS NOTES
Subjective   Cece Bustamante is a 66 y.o. female.   Gas    History of Present Illness   Having problems with flatulence. This has been going on for a while. It is worse when she eats foods high in carbohydrates and has stopped a lot of dairy.  Is taking culturelle the probiotic.  She also has some issues with constipation    The following portions of the patient's history were reviewed and updated as appropriate: allergies, current medications, past family history, past medical history, past social history, past surgical history and problem list.    Review of Systems   Constitutional: Positive for appetite change. Negative for activity change.   Respiratory: Negative for cough.    Gastrointestinal: Negative for abdominal distention and abdominal pain.   Neurological: Negative for headache and confusion.       Objective   Physical Exam  Vitals and nursing note reviewed.   Constitutional:       Appearance: She is well-developed.   HENT:      Head: Normocephalic and atraumatic.   Eyes:      Pupils: Pupils are equal, round, and reactive to light.   Pulmonary:      Effort: Pulmonary effort is normal.   Musculoskeletal:         General: Normal range of motion.   Skin:     General: Skin is warm and dry.   Neurological:      Mental Status: She is alert and oriented to person, place, and time.           Assessment/Plan   Problem List Items Addressed This Visit     None               No follow-ups on file.

## 2021-12-02 ENCOUNTER — OFFICE VISIT (OUTPATIENT)
Dept: GASTROENTEROLOGY | Facility: CLINIC | Age: 66
End: 2021-12-02

## 2021-12-02 ENCOUNTER — TELEPHONE (OUTPATIENT)
Dept: GASTROENTEROLOGY | Facility: CLINIC | Age: 66
End: 2021-12-02

## 2021-12-02 VITALS
DIASTOLIC BLOOD PRESSURE: 80 MMHG | WEIGHT: 186.9 LBS | SYSTOLIC BLOOD PRESSURE: 148 MMHG | BODY MASS INDEX: 31.14 KG/M2 | HEIGHT: 65 IN

## 2021-12-02 DIAGNOSIS — Z12.11 COLON CANCER SCREENING: ICD-10-CM

## 2021-12-02 DIAGNOSIS — R14.3 INTESTINAL GAS EXCRETION: Primary | Chronic | ICD-10-CM

## 2021-12-02 DIAGNOSIS — E73.9 LACTOSE INTOLERANCE: ICD-10-CM

## 2021-12-02 PROBLEM — R14.0 BLOATING: Status: ACTIVE | Noted: 2021-12-02

## 2021-12-02 PROCEDURE — 99204 OFFICE O/P NEW MOD 45 MIN: CPT | Performed by: INTERNAL MEDICINE

## 2021-12-02 RX ORDER — SODIUM CHLORIDE, SODIUM LACTATE, POTASSIUM CHLORIDE, CALCIUM CHLORIDE 600; 310; 30; 20 MG/100ML; MG/100ML; MG/100ML; MG/100ML
30 INJECTION, SOLUTION INTRAVENOUS CONTINUOUS
Status: CANCELLED | OUTPATIENT
Start: 2022-01-17

## 2021-12-02 NOTE — PROGRESS NOTES
"Chief Complaint   Patient presents with   • Gas   • Bloated       Subjective     HPI    Cece Bustamante is a 66 y.o. female with a past medical history noted below who presents for \"I have a lot of gas.\"  She reports this has been going on for many months, worsening.  She is having lots of flatulence. Worse with dairy which she has not completely cut it out of her diet.  She is taking beano regularly and it does help for period of time but then symptoms return.    She is on smoothe move tea as she feels she has been getting constipated.  This has been related to diet changes for weight loss, she is practicing intermittent fasting.      She does take culturelle and align    Last colonoscopy about 10 years ago and normal per her recollection    No family history of GI malignancy.    Rizwana and c section    No smoking, no ETOH    Works as teacher at [employer].          Today's visit was in the office.  Both the patient and I were wearing face masks and proper hand hygiene was performed before and after the physical exam.           Current Outpatient Medications:   •  Alpha-D-Galactosidase (Beano) tablet, Take 2 tablets by mouth As Needed., Disp: , Rfl:   •  B Complex Vitamins (vitamin b complex) capsule capsule, Take 1 capsule by mouth Daily., Disp: , Rfl:   •  Chlorphen-Phenyleph-ASA (KRIS-SELTZER PLUS COLD PO), Take  by mouth As Needed., Disp: , Rfl:   •  MELATONIN ER PO, Take 10 mg by mouth At Night As Needed., Disp: , Rfl:   •  Probiotic Product (PROBIOTIC DAILY PO), Take  by mouth Daily., Disp: , Rfl:   •  SUMAtriptan (IMITREX) 100 MG tablet, Take 1 tablet by mouth 1 (One) Time As Needed for Migraine for up to 1 dose., Disp: 9 tablet, Rfl: 6  •   MG tablet, TAKE ONE TABLET BY MOUTH EVERY 6 HOURS AS NEEDED FOR MILD PAIN (1-3), Disp: 90 tablet, Rfl: 2  •  medroxyPROGESTERone (PROVERA) 5 MG tablet, , Disp: , Rfl:   •  naproxen sodium (ALEVE) 220 MG tablet, Take 220 mg by mouth As Needed., Disp: , Rfl: "       Objective     Vitals:    12/02/21 0834   BP: 148/80         12/02/21  0834   Weight: 84.8 kg (186 lb 14.4 oz)     Body mass index is 31.1 kg/m².    Physical Exam  Constitutional:       General: She is not in acute distress.  Pulmonary:      Effort: Pulmonary effort is normal.   Abdominal:      Palpations: Abdomen is soft.      Tenderness: There is no abdominal tenderness.   Neurological:      Mental Status: She is alert and oriented to person, place, and time.   Psychiatric:         Mood and Affect: Mood normal.         Behavior: Behavior normal.         Thought Content: Thought content normal.         Judgment: Judgment normal.             WBC   Date Value Ref Range Status   10/11/2021 4.79 3.40 - 10.80 10*3/mm3 Final     RBC   Date Value Ref Range Status   10/11/2021 3.90 3.77 - 5.28 10*6/mm3 Final     Hemoglobin   Date Value Ref Range Status   10/11/2021 12.3 12.0 - 15.9 g/dL Final     Hematocrit   Date Value Ref Range Status   10/11/2021 36.8 34.0 - 46.6 % Final     MCV   Date Value Ref Range Status   10/11/2021 94.4 79.0 - 97.0 fL Final     MCH   Date Value Ref Range Status   10/11/2021 31.5 26.6 - 33.0 pg Final     MCHC   Date Value Ref Range Status   10/11/2021 33.4 31.5 - 35.7 g/dL Final     RDW   Date Value Ref Range Status   10/11/2021 13.9 12.3 - 15.4 % Final     RDW-SD   Date Value Ref Range Status   10/11/2021 47.5 37.0 - 54.0 fl Final     MPV   Date Value Ref Range Status   10/11/2021 10.9 6.0 - 12.0 fL Final     Platelets   Date Value Ref Range Status   10/11/2021 235 140 - 450 10*3/mm3 Final     Neutrophil %   Date Value Ref Range Status   10/11/2021 39.8 (L) 42.7 - 76.0 % Final     Lymphocyte %   Date Value Ref Range Status   10/11/2021 51.4 (H) 19.6 - 45.3 % Final     Monocyte %   Date Value Ref Range Status   10/11/2021 6.7 5.0 - 12.0 % Final     Eosinophil %   Date Value Ref Range Status   10/11/2021 1.5 0.3 - 6.2 % Final     Basophil %   Date Value Ref Range Status   10/11/2021 0.4 0.0 -  1.5 % Final     Immature Grans %   Date Value Ref Range Status   10/11/2021 0.2 0.0 - 0.5 % Final     Neutrophils, Absolute   Date Value Ref Range Status   10/11/2021 1.91 1.70 - 7.00 10*3/mm3 Final     Lymphocytes, Absolute   Date Value Ref Range Status   10/11/2021 2.46 0.70 - 3.10 10*3/mm3 Final     Monocytes, Absolute   Date Value Ref Range Status   10/11/2021 0.32 0.10 - 0.90 10*3/mm3 Final     Eosinophils, Absolute   Date Value Ref Range Status   10/11/2021 0.07 0.00 - 0.40 10*3/mm3 Final     Basophils, Absolute   Date Value Ref Range Status   10/11/2021 0.02 0.00 - 0.20 10*3/mm3 Final     Immature Grans, Absolute   Date Value Ref Range Status   10/11/2021 0.01 0.00 - 0.05 10*3/mm3 Final     nRBC   Date Value Ref Range Status   10/11/2021 0.0 0.0 - 0.2 /100 WBC Final       Lab Results   Component Value Date    GLUCOSE 102 (H) 10/11/2021    BUN 14 10/11/2021    CREATININE 0.69 10/11/2021    EGFRIFNONA 71 06/17/2016    EGFRIFAFRI 103 10/11/2021    BCR 20.3 10/11/2021    CO2 23.8 10/11/2021    CALCIUM 9.3 10/11/2021    PROTENTOTREF 7.3 06/17/2016    ALBUMIN 4.20 06/17/2016    LABIL2 1.4 06/17/2016    AST 20 06/17/2016    ALT 15 06/17/2016         Imaging Results (Last 7 Days)     ** No results found for the last 168 hours. **          I personally reviewed data as detailed below:     The labs listed above.    Office notes from: 11/11/21 pcp      No notes on file    Assessment/Plan    1.  Intestinal gas/flatulence: I suspect this is driven a lot by her lactose intolerance but also possible component of high FODMAPs food    2.  Lactose intolerance: She has not completely cut this out of her diet    3. Colon cancer screen: Average risk, has been 10 years since her colonoscopy    Plan  I have given her the FODMAPs handout to review  I also recommended strict lactose-free diet  I recommended she try IBgard, Gas-X  Stop oral probiotic, go to food-based probiotics  We will plan for colonoscopy for screening purposes  only.  Not for work-up of her bloating.    Diagnoses and all orders for this visit:    1. Intestinal gas excretion (Primary)  -     Case Request; Standing  -     Follow Anesthesia Guidelines / Standing Orders; Future  -     Obtain Informed Consent; Future  -     Case Request    2. Lactose intolerance  -     Case Request; Standing  -     Follow Anesthesia Guidelines / Standing Orders; Future  -     Obtain Informed Consent; Future  -     Case Request    3. Colon cancer screening        I have discussed the above plan with the patient.  They verbalize understanding and are in agreement with the plan.  They have been advised to contact the office for any questions, concerns, or changes related to their health.    Dictated utilizing Dragon dictation

## 2021-12-02 NOTE — TELEPHONE ENCOUNTER
CHANTELLE patient in office for colonoscopy .  Scheduled on  01/17/2022 with arrival time of  2:30pm. Prep packet given to patient in office. Patient also advised that his/her arrival time may fluctuate due to [facility] guidelines. CHANTELLE James--Avery.

## 2021-12-27 ENCOUNTER — TELEPHONE (OUTPATIENT)
Dept: FAMILY MEDICINE CLINIC | Facility: CLINIC | Age: 66
End: 2021-12-27

## 2021-12-27 NOTE — TELEPHONE ENCOUNTER
Caller: Cece Bustamante    Relationship: Self    Best call back number: [phone] (H)    What is the medical concern/diagnosis: CARPAL TUNNEL     What specialty or service is being requested: ORTHOPEDIC DOCTOR    What is the provider, practice or medical service name: UNKNOWN     What is the office location: UNKNOWN     What is the office phone number: UNKNOWN     Any additional details:     PATIENT'S PRIOR ORTHOPEDIC DOCTOR MOVED TO [place]     SOMEWHERE IN THE [facility]- IF NONE AVAILABLE, ANYWHERE THAT DR HANSEN SUGGESTS.     PLEASE CALL AND ADVISE

## 2022-01-04 ENCOUNTER — TELEMEDICINE (OUTPATIENT)
Dept: FAMILY MEDICINE CLINIC | Facility: CLINIC | Age: 67
End: 2022-01-04

## 2022-01-04 DIAGNOSIS — G56.03 BILATERAL CARPAL TUNNEL SYNDROME: Primary | ICD-10-CM

## 2022-01-04 PROCEDURE — 99213 OFFICE O/P EST LOW 20 MIN: CPT | Performed by: FAMILY MEDICINE

## 2022-01-04 NOTE — PROGRESS NOTES
Subjective   Cece Bustamante is a 66 y.o. female.   Carpal Tunnel (RT WORSE THAN LT)    History of Present Illness   You have chosen to receive care through a telehealth visit.  Do you consent to use a video/audio connection for your medical care today? Yes.  She is actually having this visit in our office parking lot and I was able to see her and evaluate her hand.  Cece is having a video visit today.  Her niece currently has covid and they have spent time together.  Cece is requesting a referral to hand surgery.  She states she has been diagnosed w/ bilateral carpal tunnel disorder but the doctor that she had been seeing for help with this has moved out of state.  She states that her right hand is particularly difficult to use now.  She has pain during the day and she says she wakes up with pain sometimes.  She is right-hand dominant.  She also notes some darkening of skin around her nailbeds particularly around her right 1st finger.  She would like to have a referral to a hand surgeon to determine what can be done.  She notes that she also has carpal tunnel in her left hand but is not nearly as severe.      The following portions of the patient's history were reviewed and updated as appropriate: allergies, current medications, past family history, past medical history, past social history, past surgical history and problem list.    Review of Systems   Musculoskeletal: Positive for arthralgias.        Bilateral hand pain       Objective   Physical Exam   Constitutional: She appears well-developed and well-nourished.   HENT:   Head: Normocephalic and atraumatic.   Eyes: Pupils are equal, round, and reactive to light. EOM are normal.   Pulmonary/Chest: Effort normal.   Musculoskeletal:      Comments: Decreased range of motion in right wrist.   Skin: Skin is warm and dry.   Psychiatric: She has a normal mood and affect.         Assessment/Plan   Problem List Items Addressed This Visit     None      Visit Diagnoses      Bilateral carpal tunnel syndrome    -  Primary    Relevant Orders    Ambulatory Referral to Hand Surgery        I have referred her to Dr. Paul for evaluation and treatment.       Return if symptoms worsen or fail to improve.

## 2022-01-17 ENCOUNTER — ANESTHESIA EVENT (OUTPATIENT)
Dept: GASTROENTEROLOGY | Facility: HOSPITAL | Age: 67
End: 2022-01-17

## 2022-01-17 ENCOUNTER — ANESTHESIA (OUTPATIENT)
Dept: GASTROENTEROLOGY | Facility: HOSPITAL | Age: 67
End: 2022-01-17

## 2022-01-17 ENCOUNTER — HOSPITAL ENCOUNTER (OUTPATIENT)
Facility: HOSPITAL | Age: 67
Setting detail: HOSPITAL OUTPATIENT SURGERY
Discharge: HOME | End: 2022-01-17
Attending: INTERNAL MEDICINE | Admitting: INTERNAL MEDICINE

## 2022-01-17 VITALS
SYSTOLIC BLOOD PRESSURE: 126 MMHG | OXYGEN SATURATION: 96 % | BODY MASS INDEX: 30.16 KG/M2 | DIASTOLIC BLOOD PRESSURE: 72 MMHG | TEMPERATURE: 97.3 F | HEIGHT: 65 IN | WEIGHT: 181 LBS | RESPIRATION RATE: 12 BRPM | HEART RATE: 64 BPM

## 2022-01-17 DIAGNOSIS — E73.9 LACTOSE INTOLERANCE: ICD-10-CM

## 2022-01-17 DIAGNOSIS — R14.0 BLOATING: ICD-10-CM

## 2022-01-17 DIAGNOSIS — R14.3 INTESTINAL GAS EXCRETION: ICD-10-CM

## 2022-01-17 PROCEDURE — 45380 COLONOSCOPY AND BIOPSY: CPT | Performed by: INTERNAL MEDICINE

## 2022-01-17 PROCEDURE — 25010000002 PROPOFOL 10 MG/ML EMULSION: Performed by: NURSE ANESTHETIST, CERTIFIED REGISTERED

## 2022-01-17 PROCEDURE — 88305 TISSUE EXAM BY PATHOLOGIST: CPT | Performed by: INTERNAL MEDICINE

## 2022-01-17 PROCEDURE — S0260 H&P FOR SURGERY: HCPCS | Performed by: INTERNAL MEDICINE

## 2022-01-17 RX ORDER — SODIUM CHLORIDE, SODIUM LACTATE, POTASSIUM CHLORIDE, CALCIUM CHLORIDE 600; 310; 30; 20 MG/100ML; MG/100ML; MG/100ML; MG/100ML
30 INJECTION, SOLUTION INTRAVENOUS CONTINUOUS
Status: DISCONTINUED | OUTPATIENT
Start: 2022-01-17 | End: 2022-01-17 | Stop reason: HOSPADM

## 2022-01-17 RX ORDER — SODIUM CHLORIDE, SODIUM LACTATE, POTASSIUM CHLORIDE, CALCIUM CHLORIDE 600; 310; 30; 20 MG/100ML; MG/100ML; MG/100ML; MG/100ML
INJECTION, SOLUTION INTRAVENOUS CONTINUOUS PRN
Status: DISCONTINUED | OUTPATIENT
Start: 2022-01-17 | End: 2022-01-17 | Stop reason: SURG

## 2022-01-17 RX ORDER — PROPOFOL 10 MG/ML
VIAL (ML) INTRAVENOUS AS NEEDED
Status: DISCONTINUED | OUTPATIENT
Start: 2022-01-17 | End: 2022-01-17 | Stop reason: SURG

## 2022-01-17 RX ORDER — PROPOFOL 10 MG/ML
VIAL (ML) INTRAVENOUS CONTINUOUS PRN
Status: DISCONTINUED | OUTPATIENT
Start: 2022-01-17 | End: 2022-01-17 | Stop reason: SURG

## 2022-01-17 RX ORDER — LIDOCAINE HYDROCHLORIDE 20 MG/ML
INJECTION, SOLUTION INFILTRATION; PERINEURAL AS NEEDED
Status: DISCONTINUED | OUTPATIENT
Start: 2022-01-17 | End: 2022-01-17 | Stop reason: SURG

## 2022-01-17 RX ADMIN — Medication 160 MCG/KG/MIN: at 13:25

## 2022-01-17 RX ADMIN — SODIUM CHLORIDE, POTASSIUM CHLORIDE, SODIUM LACTATE AND CALCIUM CHLORIDE 1000 ML: 600; 310; 30; 20 INJECTION, SOLUTION INTRAVENOUS at 13:00

## 2022-01-17 RX ADMIN — PROPOFOL 100 MG: 10 INJECTION, EMULSION INTRAVENOUS at 13:25

## 2022-01-17 RX ADMIN — LIDOCAINE HYDROCHLORIDE 60 MG: 20 INJECTION, SOLUTION INFILTRATION; PERINEURAL at 13:25

## 2022-01-17 NOTE — ANESTHESIA PREPROCEDURE EVALUATION
Anesthesia Evaluation     Patient summary reviewed and Nursing notes reviewed                Airway   Mallampati: I  TM distance: >3 FB  Neck ROM: full  No difficulty expected  Dental - normal exam     Pulmonary - normal exam   (+) a smoker Former, sleep apnea,   Cardiovascular - negative cardio ROS and normal exam    ECG reviewed        Neuro/Psych  (+) headaches, numbness,     GI/Hepatic/Renal/Endo    (+) obesity,       Musculoskeletal     Abdominal  - normal exam    Bowel sounds: normal.   Substance History - negative use     OB/GYN negative ob/gyn ROS         Other   arthritis,                      Anesthesia Plan    ASA 3     MAC       Anesthetic plan, all risks, benefits, and alternatives have been provided, discussed and informed consent has been obtained with: patient.

## 2022-01-17 NOTE — H&P
"[facility]  Pre Procedure History & Physical    Chief Complaint: Colon cancer screening, intestinal gas, lactose intolerance      HPI:  66 y.o. female with a past medical history noted below who presents for \"I have a lot of gas.\"  She reports this has been going on for many months, worsening.  She is having lots of flatulence. Worse with dairy which she has not completely cut it out of her diet.  She is taking beano regularly and it does help for period of time but then symptoms return.     She is on smoothe move tea as she feels she has been getting constipated.  This has been related to diet changes for weight loss, she is practicing intermittent fasting.       She does take culturelle and align     Last colonoscopy about 10 years ago and normal per her recollection     No family history of GI malignancy.     Rizwana and c section     No smoking, no ETOH     Works as teacher at [employer].         Past Medical History:   Past Medical History:   Diagnosis Date   • Abdominal cramping    • Anemia    • Arthritis    • Cervical disc disorder    • Low back pain    • Lumbosacral disc disease    • Migraine    • Peripheral neuropathy    • Post-menopausal bleeding    • Sleep apnea     no machine       Family History:  Family History   Problem Relation Age of Onset   • Cancer Mother    • Pancreatic cancer Mother    • Cancer Father    • Stroke Paternal Grandmother    • Malig Hyperthermia Neg Hx        Social History:   reports that she quit smoking about 49 years ago. Her smoking use included cigarettes. She has a 0.25 pack-year smoking history. She has never used smokeless tobacco. She reports previous drug use. She reports that she does not drink alcohol.    Medications:   Medications Prior to Admission   Medication Sig Dispense Refill Last Dose   • Alpha-D-Galactosidase (Beano) tablet Take 2 tablets by mouth As Needed.   Past Week at Unknown time   • B Complex Vitamins (vitamin b complex) " "capsule capsule Take 1 capsule by mouth Daily.   12/27/2021   • Chlorphen-Phenyleph-ASA (KRIS-SELTZER PLUS COLD PO) Take  by mouth As Needed.   Past Month at Unknown time   • MELATONIN ER PO Take 10 mg by mouth At Night As Needed.   1/3/2022   • naproxen sodium (ALEVE) 220 MG tablet Take 220 mg by mouth As Needed.   1/3/2022   • Probiotic Product (PROBIOTIC PO) Take 1 tablet by mouth Daily.   1/3/2022   • SUMAtriptan (IMITREX) 100 MG tablet Take 1 tablet by mouth 1 (One) Time As Needed for Migraine for up to 1 dose. 9 tablet 6 Past Week at Unknown time   • medroxyPROGESTERone (PROVERA) 5 MG tablet    1/15/2022       Allergies:  Iodine, Shellfish-derived products, and Other    ROS:    Pertinent items are noted in HPI     Objective     Blood pressure 130/72, pulse 59, temperature 97.3 °F (36.3 °C), temperature source Oral, resp. rate 16, height 165.1 cm (65\"), weight 82.1 kg (181 lb), SpO2 100 %, not currently breastfeeding.    Physical Exam   Constitutional: Pt is oriented to person, place, and time and well-developed, well-nourished, and in no distress.   HENT:   Mouth/Throat: Oropharynx is clear and moist.   Neck: Normal range of motion. Neck supple.   Cardiovascular: Normal rate, regular rhythm and normal heart sounds.    Pulmonary/Chest: Effort normal and breath sounds normal. No respiratory distress. No  wheezes.   Abdominal: Soft. Bowel sounds are normal.   Skin: Skin is warm and dry.   Psychiatric: Mood, memory, affect and judgment normal.     Assessment/Plan     Diagnosis: Colon cancer screening, intestinal gas, lactose intolerance      Anticipated Surgical Procedure:    Colonoscopy    The risks, benefits, and alternatives of this procedure have been discussed with the patient or the responsible party- the patient understands and agrees to proceed.  "

## 2022-01-17 NOTE — ANESTHESIA POSTPROCEDURE EVALUATION
"Patient: Cece Bustamante    Procedure Summary     Date: 01/17/22 Room / Location:  [facility]    Anesthesia Start: 1322 Anesthesia Stop: 1359    Procedure: COLONOSCOPY to cecum and TI with biopsy / polypectomies (N/A ) Diagnosis:       Intestinal gas excretion      Bloating      Lactose intolerance      (Intestinal gas excretion [R14.3])      (Bloating [R14.0])      (Lactose intolerance [E73.9])    Surgeons: Sonya Daley MD Provider: Santana Gaffney MD    Anesthesia Type: MAC ASA Status: 3          Anesthesia Type: MAC    Vitals  Vitals Value Taken Time   /72 01/17/22 1419   Temp     Pulse 64 01/17/22 1419   Resp 12 01/17/22 1419   SpO2 96 % 01/17/22 1419           Post Anesthesia Care and Evaluation    Patient location during evaluation: PACU  Patient participation: complete - patient participated  Level of consciousness: awake  Pain score: 0  Pain management: adequate  Airway patency: patent  Anesthetic complications: No anesthetic complications  PONV Status: none  Cardiovascular status: acceptable  Respiratory status: acceptable  Hydration status: acceptable    Comments: /72 (BP Location: Left arm, Patient Position: Sitting)   Pulse 64   Temp 36.3 °C (97.3 °F) (Oral)   Resp 12   Ht 165.1 cm (65\")   Wt 82.1 kg (181 lb)   SpO2 96%   BMI 30.12 kg/m²       "

## 2022-01-18 ENCOUNTER — TELEPHONE (OUTPATIENT)
Dept: GASTROENTEROLOGY | Facility: CLINIC | Age: 67
End: 2022-01-18

## 2022-01-18 LAB
LAB AP CASE REPORT: NORMAL
LAB AP CLINICAL INFORMATION: NORMAL
PATH REPORT.FINAL DX SPEC: NORMAL
PATH REPORT.GROSS SPEC: NORMAL

## 2022-01-18 NOTE — TELEPHONE ENCOUNTER
Called pt and advised of Dr Daley note. Verb understanding.    C/s placed in recall and hm for 01/17/2027.

## 2022-01-18 NOTE — TELEPHONE ENCOUNTER
----- Message from Sonya Daley MD sent at 1/18/2022  1:02 PM EST -----  Her 2 colon polyps were tubular adenomas.  The random colon biopsies showed normal tissuePlease place in 5 year colonoscopy recall, thanks

## 2022-01-18 NOTE — PROGRESS NOTES
Her 2 colon polyps were tubular adenomas.  The random colon biopsies showed normal tissuePlease place in 5 year colonoscopy recall, thanks

## 2022-02-03 ENCOUNTER — TELEPHONE (OUTPATIENT)
Dept: GASTROENTEROLOGY | Facility: CLINIC | Age: 67
End: 2022-02-03

## 2022-02-03 NOTE — TELEPHONE ENCOUNTER
"Call to pt.  States not taking align or culturelle as instructed  Is using ibgard - is helping with passing gas and burping.      However, feels like food \"just sits in my stomach\".  No BM x 1 wk, so having to use dulcolax 2-3x/wk in order to have BM.  Takes about 16 hours to work.  Concerned about persistent constipation and asking how to better manage this.     Message to DR Daley.   "

## 2022-02-03 NOTE — TELEPHONE ENCOUNTER
----- Message from Madison Young sent at 2/3/2022  9:51 AM EST -----  Regarding: GAS  Contact: [phone]  Pt is wanting to know what she can use for her gas.

## 2022-02-07 ENCOUNTER — HOSPITAL ENCOUNTER (OUTPATIENT)
Dept: CARDIOLOGY | Facility: HOSPITAL | Age: 67
Discharge: HOME | End: 2022-02-07
Admitting: PLASTIC SURGERY

## 2022-02-07 ENCOUNTER — TRANSCRIBE ORDERS (OUTPATIENT)
Dept: ADMINISTRATIVE | Facility: HOSPITAL | Age: 67
End: 2022-02-07

## 2022-02-07 DIAGNOSIS — Z01.818 PRE-OP TESTING: Primary | ICD-10-CM

## 2022-02-07 DIAGNOSIS — Z01.818 PRE-OP TESTING: ICD-10-CM

## 2022-02-07 LAB — QT INTERVAL: 378 MS

## 2022-02-07 PROCEDURE — 93010 ELECTROCARDIOGRAM REPORT: CPT | Performed by: INTERNAL MEDICINE

## 2022-02-07 PROCEDURE — 93005 ELECTROCARDIOGRAM TRACING: CPT | Performed by: PLASTIC SURGERY

## 2023-06-08 ENCOUNTER — TELEPHONE (OUTPATIENT)
Dept: ORTHOPEDIC SURGERY | Facility: CLINIC | Age: 68
End: 2023-06-08

## 2023-06-08 NOTE — TELEPHONE ENCOUNTER
Caller: Cece Bustamante    Relationship to patient: Self    Best call back number: [phone]    Patient is needing: FORMER DR. MONTAGUE PT AND WANTS TO KNOW IF DONYA WILL TAKE OVER HER CASE

## 2023-06-09 ENCOUNTER — OFFICE VISIT (OUTPATIENT)
Dept: ORTHOPEDIC SURGERY | Facility: CLINIC | Age: 68
End: 2023-06-09
Payer: MEDICARE

## 2023-06-09 VITALS
TEMPERATURE: 97.5 F | OXYGEN SATURATION: 97 % | HEART RATE: 75 BPM | WEIGHT: 181 LBS | BODY MASS INDEX: 30.16 KG/M2 | HEIGHT: 65 IN | RESPIRATION RATE: 18 BRPM

## 2023-06-09 DIAGNOSIS — M51.36 DDD (DEGENERATIVE DISC DISEASE), LUMBAR: Primary | ICD-10-CM

## 2023-06-09 DIAGNOSIS — G89.29 CHRONIC BILATERAL LOW BACK PAIN WITHOUT SCIATICA: ICD-10-CM

## 2023-06-09 DIAGNOSIS — M54.50 CHRONIC BILATERAL LOW BACK PAIN WITHOUT SCIATICA: ICD-10-CM

## 2023-06-09 DIAGNOSIS — R52 PAIN: ICD-10-CM

## 2023-06-09 PROBLEM — E66.9 OBESITY (BMI 30.0-34.9): Status: ACTIVE | Noted: 2023-06-09

## 2023-06-09 RX ORDER — METHYLPREDNISOLONE 4 MG/1
TABLET ORAL
Qty: 1 EACH | Refills: 0 | Status: SHIPPED | OUTPATIENT
Start: 2023-06-09

## 2023-06-09 NOTE — PROGRESS NOTES
Patient Name: Cece Bustamante   YOB: 1955  Referring Primary Care Physician: Gus Horner MD      Chief Complaint:    Chief Complaint   Patient presents with   • Back Pain        HPI:  Cece Bustamante is a 68 y.o. female who presents to [facility] for evaluation of low back pain and stiffness with occasional dull aching sensation in the left lateral calf that is worse at night.  Back pain is worse with prolonged sitting, standing or walking.  Longstanding history of back issues.  She has seen Dr. Feng in the past for similar complaints.  She has had 1 lumbar epidural in the past which did not offer much relief.  Back pain is much more life-limiting than the leg symptoms.  No bowel or bladder dysfunction or saddle anesthesia.  No associated injury.  Prior pertinent records were reviewed.    PFSH:  See attached    ROS: As per HPI, otherwise negative    Objective:      68 y.o. female  Body mass index is 30.12 kg/m²., 82.1 kg (181 lb), @HT@  Vitals:    06/09/23 1422   Pulse: 75   Resp: 18   Temp: 97.5 °F (36.4 °C)   SpO2: 97%         Neurologic Exam     Gait, Coordination, and Reflexes     Gait  Gait: normal    Reflexes   Right achilles: hyporeflexic  Left achilles: hyporeflexic     Ortho Exam    Spine Musculoskeletal Exam    Gait    Gait is normal.    Palpation    Thoracolumbar    Tenderness: present      Paraspinous: right and left    Right      Muscle tone: normal    Left      Muscle tone: normal    Strength    Thoracolumbar    Thoracolumbar motor exam is normal.     Sensory    Thoracolumbar      Left      Lateral leg: decreased    Reflexes    Right      Quadriceps: hyporeflexic      Achilles: hyporeflexic     Left      Quadriceps: hyporeflexic      Achilles: hyporeflexic    Special Tests    Thoracolumbar      Right      SLR: no back or leg pain      Left      SLR: no back or leg pain    General      Constitutional: moderately obese, well-developed and well-nourished     Scleral icterus: no    Labored breathing: no    Psychiatric: normal mood and affect and no acute distress    Neurological: alert and oriented x3    Skin: intact        IMAGING:     Indication: pain related symptoms,  Views: 2V AP&LAT lumbar  Findings: Reviewed and reveals minimal loss of lordosis, disc space settling most pronounced L5-S1, lower lumbar facet arthropathy noted, no fractures, no malalignment  Comparison views: Similar to 9/29/2020 one of the facet arthropathy more looks more pronounced    Assessment:           Diagnoses and all orders for this visit:    1. DDD (degenerative disc disease), lumbar (Primary)  -     MRI Lumbar Spine Without Contrast; Future  -     Ambulatory Referral to Pain Management Clinic    2. Pain  -     XR Spine Lumbar AP & Lateral    3. Chronic bilateral low back pain without sciatica  -     Ambulatory Referral to Pain Management Clinic    Other orders  -     methylPREDNISolone (MEDROL) 4 MG dose pack; Take as directed on package instructions.  Dispense: 1 each; Refill: 0             Plan:  For the continued low back pain referring to the lateral calf, will get lumbar MRI without contrast.  We will also provide her with a Medrol Dosepak.  She does have an upcoming vacation and is concerned that she will not be able to enjoy the vacation.  We discussed common side effects and advised her to take the steroid pack with her or start taking it a day or 2 in advance if symptoms worsen.  We will also refer to pain management at [facility] to discuss injection options.  She had epidural at the hospital without relief, but based on symptoms and prior MRI report, with think she would be a better candidate for facet injections/ablation which has not offered at the hospital.  We will call her with the results of the lumbar MRI and of course if there is anything of surgical concern, we will get her into [facility].  Patient's overall goal is to get more active and try to reduce her  weight, but finding this more difficult due to the back pain and stiffness.    Return for Call with results.    EMR Dragon/Transcription Disclaimer:   Much of this encounter note is an electronic transcription/translation of spoken language to printed text. The electronic translation of spoken language may permit erroneous, or at times, nonsensical words or phrases to be inadvertently transcribed; Although I have reviewed the note for such errors, some may still exist.

## 2023-07-12 PROBLEM — M51.37 DEGENERATION OF LUMBAR OR LUMBOSACRAL INTERVERTEBRAL DISC: Status: ACTIVE | Noted: 2023-07-12

## 2023-07-12 PROBLEM — M47.816 LUMBAR FACET ARTHROPATHY: Status: ACTIVE | Noted: 2023-07-12

## 2023-07-12 NOTE — H&P (VIEW-ONLY)
CHIEF COMPLAINT  Patient was referred by ELISE Haque for low back pain     Subjective   Cece Bustamante is a 68 y.o. female.   She presents to the office for initial evaluation of low back pain. She was referred here by ELISE Haque.  This patient has a longstanding history of lumbosacral spine pain with near complete resolution of pain following an L4-5 LESI which was performed in August 2019 at [facility].  Patient states that recently while living in [place] she had recurrence of her back pain after carrying a basket of laundry down some stairs.  At this time she now illustrates pain are usually originating in the midline region and extending in a bandlike distribution across the lumbosacral spine without lower extremity involvement.  She also denies lower extremity numbness, tingling, weakness nor has any complaints of bladder or bowel at function.  Pain is described as a mostly stiff, dull and achy sensation.  She finds that her pain is significantly aggravated with cooking or doing household chores and increasing her physical activity.    Patient denies any previous history of cervical or lumbar spine surgeries.    She underwent previous L4-5 LESI in 2019 at [facility] with near complete resolution of lower extremity/sciatica pain x4 years.  Patient is scheduled to undergo updated lumbar MRI on 7/14/2023.    Patient has previously attended physical therapy which improved her pain initially but then the pain returns.  She has information to start attending massage therapy and does utilize heat therapy, blue emu and Aspercreme.    Current pain 0/10 however she states that with activity it will increase.        Back Pain  This is a recurrent problem. The current episode started more than 1 year ago. The problem occurs constantly. The problem is unchanged. The pain is present in the lumbar spine. The quality of the pain is described as aching (THROBBING/ DULL/STIFF). The pain does not radiate. The pain is at a  severity of 0/10. Pain severity now: PAIN INCREASES WITH ACTIVITY/HOUSEHOLD CHORES/COOKING. The pain is Worse during the day. The symptoms are aggravated by position, standing, twisting and bending. Stiffness is present All day. Associated symptoms include headaches. Pertinent negatives include no abdominal pain, chest pain, dysuria, fever, numbness (hx migraines) or weakness.      PEG Assessment   What number best describes your pain on average in the past week?7  What number best describes how, during the past week, pain has interfered with your enjoyment of life?0  What number best describes how, during the past week, pain has interfered with your general activity?  3        Current Outpatient Medications:     Chlorphen-Phenyleph-ASA (KRIS-SELTZER PLUS COLD PO), Take  by mouth As Needed., Disp: , Rfl:     MELATONIN ER PO, Take 10 mg by mouth At Night As Needed., Disp: , Rfl:     naproxen sodium (ALEVE) 220 MG tablet, Take 1 tablet by mouth 2 (Two) Times a Day As Needed., Disp: , Rfl:     Psyllium (Metamucil) 48.57 % powder, Take  by mouth., Disp: , Rfl:     SUMAtriptan (IMITREX) 100 MG tablet, Take 1 tablet by mouth 1 (One) Time As Needed for Migraine for up to 1 dose., Disp: 9 tablet, Rfl: 6    The following portions of the patient's history were reviewed and updated as appropriate: allergies, current medications, past family history, past medical history, past social history, past surgical history, and problem list.      REVIEW OF PERTINENT MEDICAL DATA    [place] FOR MRI AND CT  [facility] [place]    [facility] [address], [place] [address]  Tel: [phone]  Fax [phone]  [address], [place] [address]  Tel: [phone]  Fax [phone] [address], [place] [address]  Tel: [phone]  Fax [phone] [address], [place] [address]  Tel: [phone]  Fax [phone]      Patient:    MECHELLE  TANNER  :       1955  MRN:       [id number]  Exam Date: 2021  Referring MD:  ANGELIC LAWSON    EXAM: MR LUMBAR SPINE WO CONTRAST    HISTORY: Low back pain rating down left hip and buttock and down left leg    TECHNIQUE: Imaging of the lumbar spine was performed on a 1.5 [employer] MRI scanner with multiple T1 and T2-weighted sequences obtained in the axial, sagittal, and coronal planes.    COMPARISON: None    FINDINGS:    The alignment is anatomic without congenital spinal stenosis. The conus ends in normal position without focal enlargement or abnormal signal. There is no clumping of the intrathecal nerve roots. Bone marrow signal intensity is normal. There are no  compression fractures.    At T12-L1 there is mild loss of disc signal with a 3 mm right paracentral and lateral recess extrusion with 8 mm caudal extent which mildly flattens the right anterolateral thecal sac without canal or foraminal stenosis.    At L1-L2 there is mild loss of disc signal with 1 mm disc bulge without canal or foraminal stenosis.    At L2-3 there is mild loss of disc signal without bulge or protrusion, canal or foraminal stenosis.    At L3-4 there is mild loss of disc signal with 1 mm disc bulge and mild facet hypertrophy without canal or foraminal stenosis.    At L4-5 there is mild loss of disc signal with a 1 to 2 mm disc bulge and mild facet hypertrophy with a left sided 4 to 5 mm subligamentous synovial cyst. These findings mildly narrow the canal and slightly narrow the lateral recesses. The neural foramen   are patent.    At L5-S1 there is mild loss of disc signal with a 3 mm broad-based central protrusion with partial annular fissure which mildly abuts the left S1 nerve root without nerve root displacement. There is mild facet and ligamentum flavum hypertrophy with  slight canal narrowing. Facet hypertrophy mildly narrow the neural foramen.  Exam End: 21 16:41    Specimen Collected: 21 15:58 Last Resulted:  "04/29/21 15:40   Received From: [facility]  Result Received: 06/09/23 14:17       Review of Systems   Constitutional:  Positive for activity change (decreased slightly) and fatigue. Negative for chills and fever.   HENT:  Negative for congestion.    Eyes:  Negative for visual disturbance.   Respiratory:  Negative for chest tightness and shortness of breath.    Cardiovascular:  Negative for chest pain.   Gastrointestinal:  Negative for abdominal pain, constipation and diarrhea.   Genitourinary:  Negative for difficulty urinating, dyspareunia and dysuria.   Musculoskeletal:  Positive for back pain.   Neurological:  Positive for headaches. Negative for dizziness, weakness, light-headedness and numbness (hx migraines).   Psychiatric/Behavioral:  Negative for agitation, self-injury, sleep disturbance and suicidal ideas. The patient is not nervous/anxious.      I have reviewed and confirmed the accuracy of the ROS as documented by the MA/LPN/RN LLOYD Guajardo  Vitals:    07/12/23 1321   BP: 142/84   Pulse: 81   Temp: 97.3 °F (36.3 °C)   SpO2: 98%   Weight: 94.7 kg (208 lb 12.8 oz)   Height: 165.1 cm (65\")   PainSc: 0-No pain         Objective     Physical Exam  Vitals reviewed.   Constitutional:       Appearance: Normal appearance.   HENT:      Head: Normocephalic.   Pulmonary:      Effort: Pulmonary effort is normal.   Musculoskeletal:      Lumbar back: Spasms and tenderness (MODERATE PAIN TO PALPATION OVER THE BILATERAL LUMBAR FACET JOINT SPACES) present. Decreased range of motion (PAIN WITH LUMBAR EXTENSION AND LATERAL BENDING).        Back:    Skin:     General: Skin is warm and dry.   Neurological:      General: No focal deficit present.      Mental Status: She is alert and oriented to person, place, and time.      Cranial Nerves: Cranial nerves 2-12 are intact.      Sensory: Sensation is intact.      Motor: Motor function is intact.      Gait: Gait is intact.      Deep Tendon Reflexes:      Reflex " Scores:       Patellar reflexes are 0 on the right side and 0 on the left side.       Achilles reflexes are 0 on the right side and 0 on the left side.  Psychiatric:         Mood and Affect: Mood normal.         Behavior: Behavior normal.         Thought Content: Thought content normal.         Judgment: Judgment normal.       Assessment & Plan   Diagnoses and all orders for this visit:    1. Lumbar facet arthropathy (Primary)    2. Degeneration of lumbar or lumbosacral intervertebral disc        --- Cece Bustamante reports a pain score of 0.  Given her pain assessment as noted, treatment options were discussed and the following options were decided upon as a follow-up plan to address the patient's pain: home exercises and therapy, patient declined analgesics, patient not interested in pharmacological measures, and steroid injections.    --- Based on physical exam and MRI findings I feel the patient would benefit from #1 diagnostic bilateral L3-5 MBB with plan to proceed to RFA if favorable response.  The risk and benefits of the procedure have been thoroughly discussed with the patient and all of her questions addressed.  --- Follow up in the office after injective therapy    Pain / Disability Scale    The scale used for measurement of pain and/or disability for this patient was the [place] back pain disability scale.  The score was 8 on 07/12/2023    Diagnostic Facet Joint Procedure:   MBB     The first diagnostic facet joint procedure is considered medically reasonable and necessary for the diagnosis and treatment of chronic pain for this patient due to the patient meeting all of the following criteria:    - 1. Moderate to severe chronic neck or low back pain, predominantly axial, that causes functional deficit measured on pain or disability scale.  - 2. Pain present for minimum of 3 months with documented failure to respond to noninvasive conservative management (as tolerated)  - 3. Absence of untreated radiculopathy  or neurogenic claudication (except for radiculopathy caused by facet joint synovial cyst)  - 4. There is no non-facet pathology per clinical assessment or radiology studies that could explain the source of the patient’s pain, including but not limited to fracture, tumor, infection, or significant deformity.    The confirmatory diagnostic facet joint procedure is considered medically reasonable and necessary for the diagnosis and treatment of chronic pain for this patient due to the patient meeting all of the following criteria:    - 1. Moderate to severe chronic neck or low back pain, predominantly axial, that causes functional deficit measured on pain or disability scale.  - 2. Pain present for minimum of 3 months with documented failure to respond to noninvasive conservative management (as tolerated)  - 3. Absence of untreated radiculopathy or neurogenic claudication (except for radiculopathy caused by facet joint synovial cyst)  - 4. There is no non-facet pathology per clinical assessment or radiology studies that could explain the source of the patient’s pain, including but not limited to fracture, tumor, infection, or significant deformity.    The patient has also shown a consistent positive response of at least 80% relief of primary (index) pain (with the duration of relief being consistent with the agent used).          LANA REPORT    As the clinician, I personally reviewed the LANA from 7/12/2023 while the patient was in the office today.      Dictated utilizing Dragon dictation.

## 2023-07-24 ENCOUNTER — HOSPITAL ENCOUNTER (OUTPATIENT)
Dept: GENERAL RADIOLOGY | Facility: SURGERY CENTER | Age: 68
Setting detail: HOSPITAL OUTPATIENT SURGERY
End: 2023-07-24
Payer: MEDICARE

## 2023-07-24 ENCOUNTER — HOSPITAL ENCOUNTER (OUTPATIENT)
Facility: SURGERY CENTER | Age: 68
Setting detail: HOSPITAL OUTPATIENT SURGERY
Discharge: HOME | End: 2023-07-24
Attending: ANESTHESIOLOGY | Admitting: ANESTHESIOLOGY
Payer: MEDICARE

## 2023-07-24 VITALS
OXYGEN SATURATION: 97 % | DIASTOLIC BLOOD PRESSURE: 75 MMHG | WEIGHT: 208 LBS | RESPIRATION RATE: 16 BRPM | BODY MASS INDEX: 35.51 KG/M2 | HEART RATE: 59 BPM | HEIGHT: 64 IN | TEMPERATURE: 97.6 F | SYSTOLIC BLOOD PRESSURE: 138 MMHG

## 2023-07-24 DIAGNOSIS — M47.816 LUMBAR FACET ARTHROPATHY: ICD-10-CM

## 2023-07-24 DIAGNOSIS — Z41.9 SURGERY, ELECTIVE: ICD-10-CM

## 2023-07-24 PROCEDURE — 64493 INJ PARAVERT F JNT L/S 1 LEV: CPT | Performed by: ANESTHESIOLOGY

## 2023-07-24 PROCEDURE — 64494 INJ PARAVERT F JNT L/S 2 LEV: CPT | Performed by: ANESTHESIOLOGY

## 2023-07-24 PROCEDURE — 25010000002 BUPIVACAINE (PF) 0.25 % SOLUTION 10 ML VIAL: Performed by: ANESTHESIOLOGY

## 2023-07-24 PROCEDURE — 76000 FLUOROSCOPY <1 HR PHYS/QHP: CPT

## 2023-07-24 PROCEDURE — 77002 NEEDLE LOCALIZATION BY XRAY: CPT

## 2023-07-24 RX ORDER — DIAZEPAM 5 MG/1
5 TABLET ORAL ONCE
Status: COMPLETED | OUTPATIENT
Start: 2023-07-24 | End: 2023-07-24

## 2023-07-24 RX ADMIN — DIAZEPAM 5 MG: 5 TABLET ORAL at 07:42

## 2023-07-24 NOTE — OP NOTE
Lumbar Medial Branch Blockade at  Bilateral L4-S1  [facility]      PREOPERATIVE DIAGNOSIS:  Lumbar spondylosis without myelopathy    POSTOPERATIVE DIAGNOSIS:  Lumbar spondylosis without myelopathy    PROCEDURE:   Diagnostic Lumbar Medial Branch Nerve Blockades, with fluoroscopy:  at the L4, L5 transverse processes and the sacral alar groove)   44262-90 -- Lumbar Facet blocks, 1st Level  14718-72 -- Lumbar Facet blocks, 2nd  Level     PRE-PROCEDURE DISCUSSION WITH PATIENT:    Risks and complications were discussed with the patient prior to starting the procedure and informed consent was obtained.      SURGEON:  Vanesa Bran MD    REASON FOR PROCEDURE:    The patient complains of pain that seems to have a significant axial component and Painful area identified on exam under fluoroscopy    SEDATION:  Anxiolysis was used for this procedure which included PO Valium 5mg  ANESTHETIC:  0.25% bupivacaine  STEROID:  None  TOTAL VOLUME OF SOLUTION:  6ml    DESCRIPTON OF PROCEDURE:  After obtaining informed consent, IV access was not obtained in the preoperative area.   The patient was taken to the operating room.  The patient was placed in the prone position with a pillow under the abdomen. All pressure points were well padded.  EKG, blood pressure, and pulse oximeter were monitored.  The patient was monitored and sedated by the RN under my direction. The lumbosacral area was prepped with Chloraprep and draped in a sterile fashion.     AP fluoroscopic image was used to visualize the junction of the right S1 superior articular process with the sacral ala.  The skin and subcutaneous tissue over the area was anesthetized with 1% lidocaine.  A 22-gauge spinal needle was then advanced percutaneously through the anesthetized skin tract under fluoroscopic guidance in a coaxial view to contact periosteum.  After negative aspiration, a volume of 1 mL of the local anesthetic was injected without resistance.  The  image was then optimized to maximize visualization of the junctions of the L4, L5 superior articular processes with the transverse processes.  The skin and subcutaneous tissue over the areas was anesthetized with 1% lidocaine.  A 22-gauge spinal needle was then advanced percutaneously through the anesthetized skin tracts under fluoroscopic guidance in a coaxial view to contact periosteum at the target sites.  After negative aspiration, a volume of 1 mL of the local anesthetic  was injected without resistance at each of the target sites.      The same procedure was then performed on the contralateral side in the exact same fashion.        Onset of analgesia was noted.  Vital signs remained stable throughout.      ESTIMATED BLOOD LOSS:  <5 mL  SPECIMENS:  none    COMPLICATIONS:   No complications were noted.    TOLERANCE & DISCHARGE CONDITION:    The patient tolerated the procedure well.  The patient was transported to the recovery area without difficulties.  The patient was discharged to home under the care of family in stable and satisfactory condition.    Pre-procedure pain score: 0/10 at rest, 7/10 with activity  Post-procedure pain score: 0/10    PLAN OF CARE:  The patient was given our standard instruction sheet.  We discussed that Lumbar Medial Branch Blockade is a diagnostic procedure in consideration for radiofrequency ablation if two diagnostic procedures prove to be positive for significant benefit.  An alternative plan could be therapeutic lumbar branch blockades.  The patient is asked to keep an account of pain relief after the procedure today.  The patient will  Return to clinic 1-2 wks.  The patient will resume all medications as per the medication reconciliation sheet.

## 2023-07-31 ENCOUNTER — PREP FOR SURGERY (OUTPATIENT)
Dept: SURGERY | Facility: SURGERY CENTER | Age: 68
End: 2023-07-31
Payer: MEDICARE

## 2023-07-31 ENCOUNTER — OFFICE VISIT (OUTPATIENT)
Dept: PAIN MEDICINE | Facility: CLINIC | Age: 68
End: 2023-07-31
Payer: MEDICARE

## 2023-07-31 VITALS
SYSTOLIC BLOOD PRESSURE: 128 MMHG | RESPIRATION RATE: 18 BRPM | BODY MASS INDEX: 35.96 KG/M2 | DIASTOLIC BLOOD PRESSURE: 100 MMHG | TEMPERATURE: 97 F | HEIGHT: 64 IN | WEIGHT: 210.6 LBS | OXYGEN SATURATION: 98 % | HEART RATE: 63 BPM

## 2023-07-31 DIAGNOSIS — M51.37 DEGENERATION OF LUMBAR OR LUMBOSACRAL INTERVERTEBRAL DISC: ICD-10-CM

## 2023-07-31 DIAGNOSIS — M47.816 LUMBAR FACET ARTHROPATHY: Primary | ICD-10-CM

## 2023-07-31 PROCEDURE — 1160F RVW MEDS BY RX/DR IN RCRD: CPT | Performed by: PHYSICIAN ASSISTANT

## 2023-07-31 PROCEDURE — 1159F MED LIST DOCD IN RCRD: CPT | Performed by: PHYSICIAN ASSISTANT

## 2023-07-31 PROCEDURE — 99214 OFFICE O/P EST MOD 30 MIN: CPT | Performed by: PHYSICIAN ASSISTANT

## 2023-07-31 PROCEDURE — 1126F AMNT PAIN NOTED NONE PRSNT: CPT | Performed by: PHYSICIAN ASSISTANT

## 2023-07-31 RX ORDER — DIAZEPAM 5 MG/1
5 TABLET ORAL ONCE
OUTPATIENT
Start: 2023-07-31 | End: 2023-07-31

## 2023-08-04 ENCOUNTER — TELEPHONE (OUTPATIENT)
Dept: PAIN MEDICINE | Facility: CLINIC | Age: 68
End: 2023-08-04

## 2023-08-04 NOTE — TELEPHONE ENCOUNTER
Provider: ANIL DELMA NELSON   Caller: MECHELLE TANNER   Phone Number: [phone] (home)     Reason for Call: PATIENT IS FEELING GOOD AND WOULD LIKE TO CANCEL HER INJECTION FOR 8/7/23.     SHE HAS A FOLLOW UP WITH ANIL CODY ON 8/14/23, PATIENT IS WANTING TO KNOW IF SHE SHOULD CANCEL THAT IF HER INJECTION IS CANCELLED.     PLEASE ADVISE PATIENT.

## 2023-10-30 RX ORDER — METHYLPREDNISOLONE 4 MG/1
TABLET ORAL
Qty: 21 TABLET | OUTPATIENT
Start: 2023-10-30

## 2023-10-30 NOTE — TELEPHONE ENCOUNTER
I need more information.  If she having radicular pain or back pain?  Has anything changed since I last saw her 4 months ago?  She may need to make a follow-up appointment because the last plan was for her to follow-up with neurosurgery if she did not get relief from injections.

## 2023-10-30 NOTE — TELEPHONE ENCOUNTER
Spoke to patient, she stated she is not in need of any medication and it must have been automated from the pharmacy. Patient states she is doing good.

## (undated) DEVICE — EPIDURAL TRAY: Brand: MEDLINE INDUSTRIES, INC.

## (undated) DEVICE — SPECIMEN ADAPTOR: Brand: BEMIS

## (undated) DEVICE — NDL HYPO ECLPS SFTY 22G 1 1/2IN

## (undated) DEVICE — SEAL HYSTERSCOPE/OUTFLOW CHANNEL MYOSURE

## (undated) DEVICE — SINGLE-USE BIOPSY FORCEPS: Brand: RADIAL JAW 4

## (undated) DEVICE — TUBING, SUCTION, 1/4" X 10', STRAIGHT: Brand: MEDLINE

## (undated) DEVICE — CANN O2 ETCO2 FITS ALL CONN CO2 SMPL A/ 7IN DISP LF

## (undated) DEVICE — ADAPT CLN BIOGUARD AIR/H2O DISP

## (undated) DEVICE — NDL SPINE 22G 31/2IN BLK

## (undated) DEVICE — ST TBG ENDOMAT HYSTSCPY

## (undated) DEVICE — SENSR O2 OXIMAX FNGR A/ 18IN NONSTR

## (undated) DEVICE — CANSTR SPECI FLUID COL BEMIS

## (undated) DEVICE — KT ORCA ORCAPOD DISP STRL

## (undated) DEVICE — DEV TISS REMOV MYOSURE REACH

## (undated) DEVICE — GLV SURG BIOGEL LTX PF 6 1/2

## (undated) DEVICE — OSC HYSTEROSCOPY: Brand: MEDLINE INDUSTRIES, INC.

## (undated) DEVICE — LN SMPL CO2 SHTRM SD STREAM W/M LUER

## (undated) DEVICE — GLV SURG TRIUMPH PF LTX 7.5 STRL